# Patient Record
Sex: FEMALE | Race: WHITE | NOT HISPANIC OR LATINO | Employment: FULL TIME | URBAN - METROPOLITAN AREA
[De-identification: names, ages, dates, MRNs, and addresses within clinical notes are randomized per-mention and may not be internally consistent; named-entity substitution may affect disease eponyms.]

---

## 2021-12-14 ENCOUNTER — TELEPHONE (OUTPATIENT)
Dept: INTERNAL MEDICINE | Facility: CLINIC | Age: 61
End: 2021-12-14
Payer: COMMERCIAL

## 2021-12-17 ENCOUNTER — OFFICE VISIT (OUTPATIENT)
Dept: PRIMARY CARE CLINIC | Facility: CLINIC | Age: 61
End: 2021-12-17
Attending: INTERNAL MEDICINE
Payer: COMMERCIAL

## 2021-12-17 VITALS
HEART RATE: 90 BPM | OXYGEN SATURATION: 100 % | DIASTOLIC BLOOD PRESSURE: 56 MMHG | SYSTOLIC BLOOD PRESSURE: 116 MMHG | WEIGHT: 202.63 LBS | BODY MASS INDEX: 38.26 KG/M2 | HEIGHT: 61 IN

## 2021-12-17 DIAGNOSIS — Z87.442 HISTORY OF KIDNEY STONES: ICD-10-CM

## 2021-12-17 DIAGNOSIS — E78.2 MIXED HYPERLIPIDEMIA: ICD-10-CM

## 2021-12-17 DIAGNOSIS — E11.9 TYPE 2 DIABETES MELLITUS WITHOUT COMPLICATION, WITHOUT LONG-TERM CURRENT USE OF INSULIN: ICD-10-CM

## 2021-12-17 PROBLEM — E66.9 OBESITY: Status: ACTIVE | Noted: 2021-12-17

## 2021-12-17 PROBLEM — E66.01 CLASS 2 SEVERE OBESITY DUE TO EXCESS CALORIES WITH SERIOUS COMORBIDITY AND BODY MASS INDEX (BMI) OF 38.0 TO 38.9 IN ADULT: Status: ACTIVE | Noted: 2021-12-17

## 2021-12-17 PROBLEM — E66.9 OBESITY: Status: RESOLVED | Noted: 2021-12-17 | Resolved: 2021-12-17

## 2021-12-17 PROBLEM — E66.812 CLASS 2 SEVERE OBESITY DUE TO EXCESS CALORIES WITH SERIOUS COMORBIDITY AND BODY MASS INDEX (BMI) OF 38.0 TO 38.9 IN ADULT: Status: ACTIVE | Noted: 2021-12-17

## 2021-12-17 PROCEDURE — 99499 NO LOS: ICD-10-PCS | Mod: S$GLB,,, | Performed by: INTERNAL MEDICINE

## 2021-12-17 PROCEDURE — 99999 PR PBB SHADOW E&M-EST. PATIENT-LVL III: CPT | Mod: PBBFAC,,, | Performed by: INTERNAL MEDICINE

## 2021-12-17 PROCEDURE — 99999 PR PBB SHADOW E&M-EST. PATIENT-LVL III: ICD-10-PCS | Mod: PBBFAC,,, | Performed by: INTERNAL MEDICINE

## 2021-12-17 PROCEDURE — 99499 UNLISTED E&M SERVICE: CPT | Mod: S$GLB,,, | Performed by: INTERNAL MEDICINE

## 2021-12-17 RX ORDER — ROSUVASTATIN CALCIUM 10 MG/1
10 TABLET, COATED ORAL DAILY
Qty: 90 TABLET | Refills: 3 | Status: SHIPPED | OUTPATIENT
Start: 2021-12-17 | End: 2022-12-16 | Stop reason: SDUPTHER

## 2021-12-17 RX ORDER — METFORMIN HYDROCHLORIDE 500 MG/1
500 TABLET, EXTENDED RELEASE ORAL
Qty: 90 TABLET | Refills: 3 | Status: SHIPPED | OUTPATIENT
Start: 2021-12-17 | End: 2024-03-13 | Stop reason: SDUPTHER

## 2022-10-28 ENCOUNTER — TELEPHONE (OUTPATIENT)
Dept: PRIMARY CARE CLINIC | Facility: CLINIC | Age: 62
End: 2022-10-28

## 2022-10-28 NOTE — TELEPHONE ENCOUNTER
pt rescheduled her appt to 12/16 her mom passed recently pt stated her schedule has been very busy but she would like Dr. Marti to know that she has been experiencing leg pain

## 2022-12-13 ENCOUNTER — TELEPHONE (OUTPATIENT)
Dept: PRIMARY CARE CLINIC | Facility: CLINIC | Age: 62
End: 2022-12-13
Payer: COMMERCIAL

## 2022-12-13 DIAGNOSIS — E11.9 TYPE 2 DIABETES MELLITUS WITHOUT COMPLICATION, WITHOUT LONG-TERM CURRENT USE OF INSULIN: Primary | ICD-10-CM

## 2022-12-13 DIAGNOSIS — E78.2 MIXED HYPERLIPIDEMIA: ICD-10-CM

## 2022-12-13 NOTE — TELEPHONE ENCOUNTER
----- Message from Kavin Marti MD sent at 12/13/2022  1:34 PM CST -----  Regarding: labs  Sheyla,    She has a few labs ordered in the system already.  I wonder if they could be scheduled to be done in the [facility] before Friday of this week.    She already has an A1c, lipid panel, microalbumin to creatinine ratio ordered.  We need to add a BMP.  I will put those in the system.  Can you reach out to her and see if she can do them in [place] at the [facility] today, tomorrow, or Thursday?    CONI

## 2022-12-14 ENCOUNTER — LAB VISIT (OUTPATIENT)
Dept: LAB | Facility: HOSPITAL | Age: 62
End: 2022-12-14
Attending: INTERNAL MEDICINE
Payer: COMMERCIAL

## 2022-12-14 DIAGNOSIS — E11.9 TYPE 2 DIABETES MELLITUS WITHOUT COMPLICATION, WITHOUT LONG-TERM CURRENT USE OF INSULIN: ICD-10-CM

## 2022-12-14 LAB
ALBUMIN/CREAT UR: 9.7 UG/MG (ref 0–30)
ANION GAP SERPL CALC-SCNC: 10 MMOL/L (ref 8–16)
BUN SERPL-MCNC: 13 MG/DL (ref 8–23)
CALCIUM SERPL-MCNC: 9.6 MG/DL (ref 8.7–10.5)
CHLORIDE SERPL-SCNC: 108 MMOL/L (ref 95–110)
CHOLEST SERPL-MCNC: 213 MG/DL (ref 120–199)
CHOLEST/HDLC SERPL: 6.1 {RATIO} (ref 2–5)
CO2 SERPL-SCNC: 23 MMOL/L (ref 23–29)
CREAT SERPL-MCNC: 0.7 MG/DL (ref 0.5–1.4)
CREAT UR-MCNC: 145 MG/DL (ref 15–325)
EST. GFR  (NO RACE VARIABLE): >60 ML/MIN/1.73 M^2
ESTIMATED AVG GLUCOSE: 131 MG/DL (ref 68–131)
GLUCOSE SERPL-MCNC: 130 MG/DL (ref 70–110)
HBA1C MFR BLD: 6.2 % (ref 4–5.6)
HDLC SERPL-MCNC: 35 MG/DL (ref 40–75)
HDLC SERPL: 16.4 % (ref 20–50)
LDLC SERPL CALC-MCNC: 142.2 MG/DL (ref 63–159)
MICROALBUMIN UR DL<=1MG/L-MCNC: 14 UG/ML
NONHDLC SERPL-MCNC: 178 MG/DL
POTASSIUM SERPL-SCNC: 4.4 MMOL/L (ref 3.5–5.1)
SODIUM SERPL-SCNC: 141 MMOL/L (ref 136–145)
TRIGL SERPL-MCNC: 179 MG/DL (ref 30–150)

## 2022-12-14 PROCEDURE — 80048 BASIC METABOLIC PNL TOTAL CA: CPT | Performed by: INTERNAL MEDICINE

## 2022-12-14 PROCEDURE — 36415 COLL VENOUS BLD VENIPUNCTURE: CPT | Performed by: INTERNAL MEDICINE

## 2022-12-14 PROCEDURE — 82043 UR ALBUMIN QUANTITATIVE: CPT | Performed by: INTERNAL MEDICINE

## 2022-12-14 PROCEDURE — 80061 LIPID PANEL: CPT | Performed by: NURSE PRACTITIONER

## 2022-12-14 PROCEDURE — 83036 HEMOGLOBIN GLYCOSYLATED A1C: CPT | Performed by: NURSE PRACTITIONER

## 2022-12-14 PROCEDURE — 82570 ASSAY OF URINE CREATININE: CPT | Performed by: INTERNAL MEDICINE

## 2022-12-16 ENCOUNTER — OFFICE VISIT (OUTPATIENT)
Dept: PRIMARY CARE CLINIC | Facility: CLINIC | Age: 62
End: 2022-12-16
Attending: INTERNAL MEDICINE
Payer: COMMERCIAL

## 2022-12-16 VITALS
HEART RATE: 83 BPM | HEIGHT: 61 IN | OXYGEN SATURATION: 98 % | WEIGHT: 201.06 LBS | SYSTOLIC BLOOD PRESSURE: 127 MMHG | DIASTOLIC BLOOD PRESSURE: 78 MMHG | BODY MASS INDEX: 37.96 KG/M2

## 2022-12-16 DIAGNOSIS — Z00.00 HEALTHCARE MAINTENANCE: ICD-10-CM

## 2022-12-16 DIAGNOSIS — E78.2 MIXED HYPERLIPIDEMIA: ICD-10-CM

## 2022-12-16 DIAGNOSIS — E11.9 TYPE 2 DIABETES MELLITUS WITHOUT COMPLICATION, WITHOUT LONG-TERM CURRENT USE OF INSULIN: Primary | ICD-10-CM

## 2022-12-16 DIAGNOSIS — Z12.39 ENCOUNTER FOR SCREENING FOR MALIGNANT NEOPLASM OF BREAST, UNSPECIFIED SCREENING MODALITY: ICD-10-CM

## 2022-12-16 DIAGNOSIS — Z12.11 SCREENING FOR COLON CANCER: ICD-10-CM

## 2022-12-16 DIAGNOSIS — E66.01 CLASS 2 SEVERE OBESITY DUE TO EXCESS CALORIES WITH SERIOUS COMORBIDITY AND BODY MASS INDEX (BMI) OF 38.0 TO 38.9 IN ADULT: ICD-10-CM

## 2022-12-16 DIAGNOSIS — Z12.4 SCREENING FOR CERVICAL CANCER: ICD-10-CM

## 2022-12-16 PROCEDURE — 3008F BODY MASS INDEX DOCD: CPT | Mod: CPTII,S$GLB,, | Performed by: INTERNAL MEDICINE

## 2022-12-16 PROCEDURE — 99214 OFFICE O/P EST MOD 30 MIN: CPT | Mod: 25,S$GLB,, | Performed by: INTERNAL MEDICINE

## 2022-12-16 PROCEDURE — 3061F PR NEG MICROALBUMINURIA RESULT DOCUMENTED/REVIEW: ICD-10-PCS | Mod: CPTII,S$GLB,, | Performed by: INTERNAL MEDICINE

## 2022-12-16 PROCEDURE — 99999 PR PBB SHADOW E&M-EST. PATIENT-LVL V: ICD-10-PCS | Mod: PBBFAC,,, | Performed by: INTERNAL MEDICINE

## 2022-12-16 PROCEDURE — 3078F DIAST BP <80 MM HG: CPT | Mod: CPTII,S$GLB,, | Performed by: INTERNAL MEDICINE

## 2022-12-16 PROCEDURE — 90471 IMMUNIZATION ADMIN: CPT | Mod: S$GLB,,, | Performed by: INTERNAL MEDICINE

## 2022-12-16 PROCEDURE — 90715 TDAP VACCINE 7 YRS/> IM: CPT | Mod: S$GLB,,, | Performed by: INTERNAL MEDICINE

## 2022-12-16 PROCEDURE — 99214 PR OFFICE/OUTPT VISIT, EST, LEVL IV, 30-39 MIN: ICD-10-PCS | Mod: 25,S$GLB,, | Performed by: INTERNAL MEDICINE

## 2022-12-16 PROCEDURE — 3074F PR MOST RECENT SYSTOLIC BLOOD PRESSURE < 130 MM HG: ICD-10-PCS | Mod: CPTII,S$GLB,, | Performed by: INTERNAL MEDICINE

## 2022-12-16 PROCEDURE — 1159F MED LIST DOCD IN RCRD: CPT | Mod: CPTII,S$GLB,, | Performed by: INTERNAL MEDICINE

## 2022-12-16 PROCEDURE — 3008F PR BODY MASS INDEX (BMI) DOCUMENTED: ICD-10-PCS | Mod: CPTII,S$GLB,, | Performed by: INTERNAL MEDICINE

## 2022-12-16 PROCEDURE — 3044F PR MOST RECENT HEMOGLOBIN A1C LEVEL <7.0%: ICD-10-PCS | Mod: CPTII,S$GLB,, | Performed by: INTERNAL MEDICINE

## 2022-12-16 PROCEDURE — 3061F NEG MICROALBUMINURIA REV: CPT | Mod: CPTII,S$GLB,, | Performed by: INTERNAL MEDICINE

## 2022-12-16 PROCEDURE — 3066F NEPHROPATHY DOC TX: CPT | Mod: CPTII,S$GLB,, | Performed by: INTERNAL MEDICINE

## 2022-12-16 PROCEDURE — 1159F PR MEDICATION LIST DOCUMENTED IN MEDICAL RECORD: ICD-10-PCS | Mod: CPTII,S$GLB,, | Performed by: INTERNAL MEDICINE

## 2022-12-16 PROCEDURE — 1160F PR REVIEW ALL MEDS BY PRESCRIBER/CLIN PHARMACIST DOCUMENTED: ICD-10-PCS | Mod: CPTII,S$GLB,, | Performed by: INTERNAL MEDICINE

## 2022-12-16 PROCEDURE — 1160F RVW MEDS BY RX/DR IN RCRD: CPT | Mod: CPTII,S$GLB,, | Performed by: INTERNAL MEDICINE

## 2022-12-16 PROCEDURE — 99999 PR PBB SHADOW E&M-EST. PATIENT-LVL V: CPT | Mod: PBBFAC,,, | Performed by: INTERNAL MEDICINE

## 2022-12-16 PROCEDURE — 90715 TDAP VACCINE GREATER THAN OR EQUAL TO 7YO IM: ICD-10-PCS | Mod: S$GLB,,, | Performed by: INTERNAL MEDICINE

## 2022-12-16 PROCEDURE — 3066F PR DOCUMENTATION OF TREATMENT FOR NEPHROPATHY: ICD-10-PCS | Mod: CPTII,S$GLB,, | Performed by: INTERNAL MEDICINE

## 2022-12-16 PROCEDURE — 90471 TDAP VACCINE GREATER THAN OR EQUAL TO 7YO IM: ICD-10-PCS | Mod: S$GLB,,, | Performed by: INTERNAL MEDICINE

## 2022-12-16 PROCEDURE — 3044F HG A1C LEVEL LT 7.0%: CPT | Mod: CPTII,S$GLB,, | Performed by: INTERNAL MEDICINE

## 2022-12-16 PROCEDURE — 3078F PR MOST RECENT DIASTOLIC BLOOD PRESSURE < 80 MM HG: ICD-10-PCS | Mod: CPTII,S$GLB,, | Performed by: INTERNAL MEDICINE

## 2022-12-16 PROCEDURE — 3074F SYST BP LT 130 MM HG: CPT | Mod: CPTII,S$GLB,, | Performed by: INTERNAL MEDICINE

## 2022-12-16 RX ORDER — ROSUVASTATIN CALCIUM 10 MG/1
10 TABLET, COATED ORAL DAILY
Qty: 90 TABLET | Refills: 3 | Status: SHIPPED | OUTPATIENT
Start: 2022-12-16 | End: 2024-03-13 | Stop reason: SDUPTHER

## 2022-12-16 NOTE — ASSESSMENT & PLAN NOTE
Due for cervical CA screening  Due for Mammogram  Due for Colorectal CA screening    Due for Tdap vaccine      Health Related Goals:  1)  Wants to lose weight and get down to 150 lbs (has lost 42 lbs over the past few years)  2)  Wants to exercise regularly  3)  Be more attentive to DM

## 2022-12-16 NOTE — ASSESSMENT & PLAN NOTE
Not seen since one year ago (12/2021)  Recent A1c 12/2022 of 6.2 -- at goal (goal < 7.0) -- recheck 6/2023  No albuminuria as of 12/2022  -- recheck 12/2023  Needs diabetic eye exam -- ordered optometry eval  Foot exam done today -- no neuropathy as of 12/2023    Resume metformin  mg -- educated about diet and tolerability  Needs to be on at least a moderate intensity statin and may attempt to achieve LDL goal < 70 (based on new ADA guidelines) -- will start with crestor 10 mg daily, but plan to increase progressively as tolerated  Discussed importance of exercise  Recheck next A1c in 6 months (6/2023)

## 2022-12-16 NOTE — PATIENT INSTRUCTIONS
PLEASE REMEMBER TO CALLL US FIRST with any medical questions or concerns. We try very hard to keep you out of the emergency room if we are able to see you and help with your concern. It is one of our many ways to keep our patients healthy.   For now the best way to reach us is by calling [facility]'s direct number:  CALL OUR OFFICE AT: [phone]    In the months to come we are also hoping to be available for scheduling via the portal.  Dr. Jermaine Dhillon and RAMIN Platt are both here and available most days of the week if you need to be seen in person.  I am here every Friday but can also make myself available on other days if necessary as long as I have enough notice.  Please do not ever hesitate to reach to me via the patient portal.      Please let us know if you have any challenges getting scheduled with our behavioral therapist/ or dietitian or if you have any difficulty with any new medication(s) that were prescribed.     Dr. HAMILTON     Tolerating Metformin    Common side effects of metformin intolerance include nausea, diarrhea, vomiting and abdominal pain. A properly balanced meal/snack containing protein and carbohydrates can help improve tolerance of metformin and reduce some of the above symptoms.     Pairing protein and carbohydrate foods together at the time you take your medication is key. Protein foods break down slowly and slow down the digestion of carbohydrates. Therefore, keeping your stomach full for longer periods of time. Protein food sources include eggs, cheese, turkey, chicken, beef, pork, seafood, greek yogurt and nuts. The most nutrient-dense carbohydrates include fruits, vegetables and beans.     As Metformin is often taken in the morning, a balanced breakfast of protein and carbohydrates will aid in tolerance. Below are some breakfast and snack options:    -2 eggs with a serving of fruit   -Banana or apple with peanut butter/almond butter  -Greek yogurt topped with fruit  or nuts of choice  -String cheese and grapes  -Handful nuts (any kind) with fruit  -Toast with 2 Tbsp peanut butter/almond butter  -Cottage cheese with peaches  -Chicken salad with veggie dippers  -Protein supplements (bars/shakes/smoothies)     *It is best to avoid high carb and high fat (greasy) meals when taking metformin...and NEVER take on an empty stomach.

## 2022-12-16 NOTE — PROGRESS NOTES
"Patient ID: Lexis Allen is a 62 y.o. female.    Chief Complaint: Follow-up      HPI:    HPI   Here for follow-up.  Not seen since 2021.  A1c still at goal at 6.2.  BMP normal.  No albuminuria.  Lipid panel -- TG/HDL ratio 5.1, , non-HDL cholesterol 178,   Her mom passed away in .  She missed her appointment in March because she started making recurrent trips from  to [place] and was spending all of her time with her mother.    She only took the metformin for a few days and then stopped because of diarrhea.  She also stopped the crestor because she wasn't sure which medicine was causing the diarrhea.      Her mother was 85 at the age of her death and did suffer from DM.  Her mother's death has prompted her to want to try and take better care of herself and her own DM.  Her father also had DM and  at the age of 90.      Still with the same discomfort in her left foot, feeling like a "lump" on volar aspect when she is barefoot.  Stable and chronic for past year.    Last PAP smear > 3 years ago  Last mammogram about 3 years ago  Last colonoscopy ~8 years ago per her recollection    Exercising 2 days per week -- 30 minutes each time -- walking  Her children bought her a treadmill    She will check her sugars often and worries excessively when they fluctuate from 110s to 130s.  Advised her that as a type 2 diabetic who is not on insulin, no need to check sugars.  We will follow A1c instead.   She has been following a carb restrictive diet.  Discussed that she could start including fruits as part of her diet.    Discussed importance of regular exercise.     The 10-year ASCVD risk score (Lory SANTA, et al., 2019) is: 10.2%    Values used to calculate the score:      Age: 62 years      Sex: Female      Is Non- : No      Diabetic: Yes      Tobacco smoker: No      Systolic Blood Pressure: 127 mmHg      Is BP treated: No      HDL Cholesterol: 35 mg/dL      Total Cholesterol: " "213 mg/dL       Review of Systems   Constitutional:  Negative for malaise/fatigue.   Eyes:  Negative for blurred vision.   Respiratory:  Negative for shortness of breath.    Cardiovascular:  Negative for chest pain.   Gastrointestinal:  Negative for blood in stool and heartburn.   Genitourinary:  Positive for hematuria (one episode from kidney stone in November that passed naturally).   Musculoskeletal:  Negative for myalgias.   Neurological:  Positive for tingling (left hand, feels like most of her fingers).   Endo/Heme/Allergies:  Negative for polydipsia.      Vital Signs  /78 (BP Location: Right arm, Patient Position: Sitting, BP Method: Large (Automatic))   Pulse 83   Ht 5' 1" (1.549 m)   Wt 91.2 kg (201 lb 1 oz)   SpO2 98%   BMI 37.99 kg/m²   Physical Exam  General:  Well-developed, well-nourished, no acute distress  Head:  Normocephalic, atraumatic  Eyes:  PERRL, conjunctiva pink without hemorrhages or petechiae, anicteric sclera, no episcleritis  Nose:  Normal turbinates, pink mucosa without purulent nasal discharge  Mouth:  Moist mucous membranes, no pharyngeal exudates or erythema  Neck:: Supple, normal ROM; No carotid bruit with normal carotid upstroke; No JVD  Chest:  clear to auscultation bilaterally, no respiratory distress  Heart:  Regular rate and rhythm without murmur, rub, or gallop  Abdomen:  Soft, nontender, no distension, normoactive bowel sounds, no abdominal bruit  Extremities:  No clubbing, cyanosis, or edema  Neuro:  Alert and oriented, no focal deficits    Protective Sensation (w/ 10 gram monofilament):  Right: Intact  Left: Intact    Visual Inspection:  Callus -  Bilateral    Pedal Pulses:   Right: Present  Left: Present    Posterior tibialis:   Right:Present  Left: Present     Physical Exam      Screening or Prevention Patient's value Goal Complete/Controlled?   HgA1C Testing and Control   Lab Results   Component Value Date    HGBA1C 6.2 (H) 12/14/2022      Annually/Less than 8% " Yes     Lipid profile : 12/14/2022 Annually Yes     LDL control Lab Results   Component Value Date    LDLCALC 142.2 12/14/2022    Annually/Less than 100 mg/dl  No     Nephropathy screening Lab Results   Component Value Date    LABMICR 14.0 12/14/2022     Lab Results   Component Value Date    PROTEINUA Negative 06/23/2009    Annually Yes     Blood pressure BP Readings from Last 1 Encounters:   12/16/22 127/78    Less than 140/90 Yes     Dilated retinal exam Most Recent Eye Exam Date: Not Found Annually Yes     Foot exam   : 12/16/2022 Annually Yes       Hemoglobin A1C   Date Value Ref Range Status   12/14/2022 6.2 (H) 4.0 - 5.6 % Final     Comment:     ADA Screening Guidelines:  5.7-6.4%  Consistent with prediabetes  >or=6.5%  Consistent with diabetes    High levels of fetal hemoglobin interfere with the HbA1C  assay. Heterozygous hemoglobin variants (HbS, HgC, etc)do  not significantly interfere with this assay.   However, presence of multiple variants may affect accuracy.     08/26/2011 6.2 4.0 - 6.2 % Final   08/18/2010 5.9 4.0 - 6.2 % Final       Chemistry        Component Value Date/Time     12/14/2022 0731    K 4.4 12/14/2022 0731     12/14/2022 0731    CO2 23 12/14/2022 0731    BUN 13 12/14/2022 0731    CREATININE 0.7 12/14/2022 0731     (H) 12/14/2022 0731        Component Value Date/Time    CALCIUM 9.6 12/14/2022 0731    AST 29 08/26/2011 0953    ALT 35 08/26/2011 0953            Assessment/Plan  Type 2 diabetes mellitus without complication, without long-term current use of insulin  Not seen since one year ago (12/2021)  Recent A1c 12/2022 of 6.2 -- at goal (goal < 7.0) -- recheck 6/2023  No albuminuria as of 12/2022  -- recheck 12/2023  Needs diabetic eye exam -- ordered optometry eval  Foot exam done today -- no neuropathy as of 12/2023    Resume metformin  mg -- educated about diet and tolerability  Needs to be on at least a moderate intensity statin and may attempt to achieve  LDL goal < 70 (based on new ADA guidelines) -- will start with crestor 10 mg daily, but plan to increase progressively as tolerated  Discussed importance of exercise  Recheck next A1c in 6 months (6/2023)      Healthcare maintenance  Due for cervical CA screening  Due for Mammogram  Due for Colorectal CA screening    Due for Tdap vaccine      Health Related Goals:  1)  Wants to lose weight and get down to 150 lbs (has lost 42 lbs over the past few years)  2)  Wants to exercise regularly  3)  Be more attentive to DM    Mixed hyperlipidemia  Goal LDL < 100 or maybe even < 70  Re-start Crestor 10 mg daily  Recheck in 1 to 3 months    Class 2 severe obesity due to excess calories with serious comorbidity and body mass index (BMI) of 38.0 to 38.9 in adult  Discussed increased efforts at diet, exercise, and weight loss     Follow up in about 3 months (around 3/16/2023).    Orders Placed This Encounter   Procedures    Mammo Digital Screening Bilat     Standing Status:   Future     Standing Expiration Date:   12/16/2024     Order Specific Question:   May the Radiologist modify the order per protocol to meet the clinical needs of the patient?     Answer:   Yes     Order Specific Question:   Release to patient     Answer:   Immediate    (In Office Administered) Tdap Vaccine    Ambulatory referral/consult to Endo Procedure      Standing Status:   Future     Standing Expiration Date:   6/16/2023     Referral Priority:   Routine     Referral Type:   Consultation     Number of Visits Requested:   1    Ambulatory referral/consult to Gynecology     Standing Status:   Future     Standing Expiration Date:   1/16/2024     Referral Priority:   Routine     Referral Type:   Consultation     Referral Reason:   Specialty Services Required     Requested Specialty:   Gynecology     Number of Visits Requested:   1       Health Maintenance  Health Maintenance         Date Due Completion Date    Hepatitis C Screening Never done ---     Cervical Cancer Screening Never done ---    HIV Screening Never done ---    TETANUS VACCINE Never done ---    Mammogram Never done ---    Shingles Vaccine (1 of 2) Never done ---    Colorectal Cancer Screening 01/01/2015 1/1/2012    COVID-19 Vaccine (4 - Booster for Pfizer series) 01/17/2022 11/22/2021    Influenza Vaccine (1) Never done ---    Lipid Panel 12/14/2027 12/14/2022              Time: Spent 30 minutes with patient, with > 50% of time spent counseling

## 2022-12-16 NOTE — PROGRESS NOTES
After obtaining consent, and per orders of Dr. Marti, injection of Tdap given by SISSY ARIAS. Patient instructed to remain in clinic for 15 minutes afterwards, and to report any adverse reaction to staff immediately.

## 2022-12-16 NOTE — ASSESSMENT & PLAN NOTE
Goal LDL < 100 or maybe even < 70  Re-start Crestor 10 mg daily  Recheck in 1 to 3 months (1/2023 to 3/2023)

## 2022-12-17 ENCOUNTER — OFFICE VISIT (OUTPATIENT)
Dept: URGENT CARE | Facility: CLINIC | Age: 62
End: 2022-12-17
Payer: COMMERCIAL

## 2022-12-17 VITALS
OXYGEN SATURATION: 97 % | RESPIRATION RATE: 18 BRPM | DIASTOLIC BLOOD PRESSURE: 80 MMHG | SYSTOLIC BLOOD PRESSURE: 130 MMHG | BODY MASS INDEX: 37.57 KG/M2 | TEMPERATURE: 101 F | WEIGHT: 199 LBS | HEART RATE: 111 BPM | HEIGHT: 61 IN

## 2022-12-17 DIAGNOSIS — R50.9 FEVER, UNSPECIFIED FEVER CAUSE: ICD-10-CM

## 2022-12-17 DIAGNOSIS — J06.9 UPPER RESPIRATORY TRACT INFECTION, UNSPECIFIED TYPE: Primary | ICD-10-CM

## 2022-12-17 LAB
CTP QC/QA: YES
CTP QC/QA: YES
POC MOLECULAR INFLUENZA A AGN: NEGATIVE
POC MOLECULAR INFLUENZA B AGN: NEGATIVE
SARS-COV-2 AG RESP QL IA.RAPID: NEGATIVE

## 2022-12-17 PROCEDURE — 3075F PR MOST RECENT SYSTOLIC BLOOD PRESS GE 130-139MM HG: ICD-10-PCS | Mod: CPTII,S$GLB,, | Performed by: NURSE PRACTITIONER

## 2022-12-17 PROCEDURE — 3066F PR DOCUMENTATION OF TREATMENT FOR NEPHROPATHY: ICD-10-PCS | Mod: CPTII,S$GLB,, | Performed by: NURSE PRACTITIONER

## 2022-12-17 PROCEDURE — 87811 SARS-COV-2 COVID19 W/OPTIC: CPT | Mod: QW,S$GLB,, | Performed by: NURSE PRACTITIONER

## 2022-12-17 PROCEDURE — 87502 INFLUENZA DNA AMP PROBE: CPT | Mod: QW,S$GLB,, | Performed by: NURSE PRACTITIONER

## 2022-12-17 PROCEDURE — 3066F NEPHROPATHY DOC TX: CPT | Mod: CPTII,S$GLB,, | Performed by: NURSE PRACTITIONER

## 2022-12-17 PROCEDURE — 3075F SYST BP GE 130 - 139MM HG: CPT | Mod: CPTII,S$GLB,, | Performed by: NURSE PRACTITIONER

## 2022-12-17 PROCEDURE — 1160F PR REVIEW ALL MEDS BY PRESCRIBER/CLIN PHARMACIST DOCUMENTED: ICD-10-PCS | Mod: CPTII,S$GLB,, | Performed by: NURSE PRACTITIONER

## 2022-12-17 PROCEDURE — 1160F RVW MEDS BY RX/DR IN RCRD: CPT | Mod: CPTII,S$GLB,, | Performed by: NURSE PRACTITIONER

## 2022-12-17 PROCEDURE — 3044F PR MOST RECENT HEMOGLOBIN A1C LEVEL <7.0%: ICD-10-PCS | Mod: CPTII,S$GLB,, | Performed by: NURSE PRACTITIONER

## 2022-12-17 PROCEDURE — 3044F HG A1C LEVEL LT 7.0%: CPT | Mod: CPTII,S$GLB,, | Performed by: NURSE PRACTITIONER

## 2022-12-17 PROCEDURE — 3079F DIAST BP 80-89 MM HG: CPT | Mod: CPTII,S$GLB,, | Performed by: NURSE PRACTITIONER

## 2022-12-17 PROCEDURE — 99213 PR OFFICE/OUTPT VISIT, EST, LEVL III, 20-29 MIN: ICD-10-PCS | Mod: S$GLB,,, | Performed by: NURSE PRACTITIONER

## 2022-12-17 PROCEDURE — 3079F PR MOST RECENT DIASTOLIC BLOOD PRESSURE 80-89 MM HG: ICD-10-PCS | Mod: CPTII,S$GLB,, | Performed by: NURSE PRACTITIONER

## 2022-12-17 PROCEDURE — 1159F MED LIST DOCD IN RCRD: CPT | Mod: CPTII,S$GLB,, | Performed by: NURSE PRACTITIONER

## 2022-12-17 PROCEDURE — 99213 OFFICE O/P EST LOW 20 MIN: CPT | Mod: S$GLB,,, | Performed by: NURSE PRACTITIONER

## 2022-12-17 PROCEDURE — 3008F BODY MASS INDEX DOCD: CPT | Mod: CPTII,S$GLB,, | Performed by: NURSE PRACTITIONER

## 2022-12-17 PROCEDURE — 1159F PR MEDICATION LIST DOCUMENTED IN MEDICAL RECORD: ICD-10-PCS | Mod: CPTII,S$GLB,, | Performed by: NURSE PRACTITIONER

## 2022-12-17 PROCEDURE — 3061F PR NEG MICROALBUMINURIA RESULT DOCUMENTED/REVIEW: ICD-10-PCS | Mod: CPTII,S$GLB,, | Performed by: NURSE PRACTITIONER

## 2022-12-17 PROCEDURE — 87502 POCT INFLUENZA A/B MOLECULAR: ICD-10-PCS | Mod: QW,S$GLB,, | Performed by: NURSE PRACTITIONER

## 2022-12-17 PROCEDURE — 3008F PR BODY MASS INDEX (BMI) DOCUMENTED: ICD-10-PCS | Mod: CPTII,S$GLB,, | Performed by: NURSE PRACTITIONER

## 2022-12-17 PROCEDURE — 87811 SARS CORONAVIRUS 2 ANTIGEN POCT, MANUAL READ: ICD-10-PCS | Mod: QW,S$GLB,, | Performed by: NURSE PRACTITIONER

## 2022-12-17 PROCEDURE — 3061F NEG MICROALBUMINURIA REV: CPT | Mod: CPTII,S$GLB,, | Performed by: NURSE PRACTITIONER

## 2022-12-17 RX ORDER — ACETAMINOPHEN 500 MG
1000 TABLET ORAL
Status: COMPLETED | OUTPATIENT
Start: 2022-12-17 | End: 2022-12-17

## 2022-12-17 RX ADMIN — Medication 1000 MG: at 05:12

## 2022-12-17 NOTE — PROGRESS NOTES
"Subjective:       Patient ID: Lexis Allen is a 62 y.o. female.    Vitals:  height is 5' 1" (1.549 m) and weight is 90.3 kg (199 lb). Her oral temperature is 101.2 °F (38.4 °C) (abnormal). Her blood pressure is 130/80 and her pulse is 111 (abnormal). Her respiration is 18 and oxygen saturation is 97%.     Chief Complaint: Fever (Started yesterday)    Patient presents for evaluation  body aches that started last night. Pt reports chills, body aches, headache, and diarrhea. Pt is running a 101.3 fever in clinic. She hasn't taken any fever reducer today. Patient reports receiving Tdap injection yesterday. She also reports right side pleuritic pain with cough and deep breathing.      Fever   This is a new problem. The current episode started yesterday. The problem occurs intermittently. The problem has been gradually worsening. Her temperature was unmeasured prior to arrival. Associated symptoms include congestion, coughing, diarrhea, headaches and muscle aches.     Constitution: Positive for fever.   HENT:  Positive for congestion.    Respiratory:  Positive for cough.         Right side pleuritic pain with cough and deep breathing    Gastrointestinal:  Positive for diarrhea.   Neurological:  Positive for headaches.     Objective:      Physical Exam   Constitutional: She is oriented to person, place, and time. She appears well-developed. She is cooperative.  Non-toxic appearance. She does not appear ill. No distress.   HENT:   Head: Normocephalic and atraumatic.   Ears:   Right Ear: Hearing, tympanic membrane, external ear and ear canal normal.   Left Ear: Hearing, tympanic membrane, external ear and ear canal normal.   Nose: Nose normal. No mucosal edema, rhinorrhea or nasal deformity. No epistaxis. Right sinus exhibits no maxillary sinus tenderness and no frontal sinus tenderness. Left sinus exhibits no maxillary sinus tenderness and no frontal sinus tenderness.   Mouth/Throat: Uvula is midline and mucous " membranes are normal. No trismus in the jaw. Normal dentition. No uvula swelling. Posterior oropharyngeal edema and posterior oropharyngeal erythema (mild) present. No oropharyngeal exudate, tonsillar abscesses or cobblestoning. Tonsils are 1+ on the right. Tonsils are 1+ on the left.   Eyes: Conjunctivae and lids are normal. No scleral icterus.   Neck: Trachea normal and phonation normal. Neck supple. No edema present. No erythema present. No neck rigidity present.   Cardiovascular: Normal rate, regular rhythm, normal heart sounds and normal pulses.   Pulmonary/Chest: Effort normal and breath sounds normal. No respiratory distress. She has no decreased breath sounds. She has no rhonchi.   Abdominal: Normal appearance.   Musculoskeletal: Normal range of motion.         General: No deformity. Normal range of motion.   Neurological: She is alert and oriented to person, place, and time. She exhibits normal muscle tone. Coordination normal.   Skin: Skin is warm, dry, intact, not diaphoretic and not pale.   Psychiatric: Her speech is normal and behavior is normal. Judgment and thought content normal.   Nursing note and vitals reviewed.      Assessment:       1. Upper respiratory tract infection, unspecified type    2. Fever, unspecified fever cause          Plan:         Upper respiratory tract infection, unspecified type    Fever, unspecified fever cause  -     POCT Influenza A/B MOLECULAR  -     SARS Coronavirus 2 Antigen, POCT Manual Read    Other orders  -     acetaminophen tablet 1,000 mg         Results for orders placed or performed in visit on 12/17/22   POCT Influenza A/B MOLECULAR   Result Value Ref Range    POC Molecular Influenza A Ag Negative Negative, Not Reported    POC Molecular Influenza B Ag Negative Negative, Not Reported     Acceptable Yes    SARS Coronavirus 2 Antigen, POCT Manual Read   Result Value Ref Range    SARS Coronavirus 2 Antigen Negative Negative      Acceptable Yes             PLEASE READ YOUR DISCHARGE INSTRUCTIONS ENTIRELY AS IT CONTAINS IMPORTANT INFORMATION.      Please drink plenty of fluids.    Please get plenty of rest.    Please return here or go to the Emergency Department for any concerns or worsening of condition.    Please take an over the counter antihistamine medication (allegra/Claritin/Zyrtec) of your choice as directed.    Try an over the counter decongestant like Mucinex D or Sudafed. You buy this behind the pharmacy counter    If you do have Hypertension or palpitations, it is safe to take Coricidin HBP for relief of sinus symptoms.    If not allergic, please take over the counter Tylenol (Acetaminophen) and/or Motrin (Ibuprofen) as directed for control of pain and/or fever.  Please follow up with your primary care doctor or specialist as needed.    Sore throat recommendations: Warm fluids, warm salt water gargles, throat lozenges, tea, honey, soup, rest, hydration.    Use over the counter flonase: one spray each nostril twice daily OR two sprays each nostril once daily.     Sinus rinses DO NOT USE TAP WATER, if you must, water must be a rolling boil for 1 minute, let it cool, then use.  May use distilled water, or over the counter nasal saline rinses.  Vics vapor rub in shower to help open nasal passages.  May use nasal gel to keep passages moisturized.  May use Nasal saline sprays during the day for added relief of congestion.   For those who go to the gym, please do not use the sauna or steam room now to clear sinuses.    If you  smoke, please stop smoking.      Please return or see your primary care doctor if you develop new or worsening symptoms.     Please arrange follow up with your primary medical clinic as soon as possible. You must understand that you've received an Urgent Care treatment only and that you may be released before all of your medical problems are known or treated. You, the patient, will arrange for follow up as  instructed. If your symptoms worsen or fail to improve you should go to the Emergency Room.

## 2022-12-19 ENCOUNTER — HOSPITAL ENCOUNTER (OUTPATIENT)
Dept: RADIOLOGY | Facility: CLINIC | Age: 62
Discharge: HOME | End: 2022-12-19
Attending: PHYSICIAN ASSISTANT
Payer: COMMERCIAL

## 2022-12-19 ENCOUNTER — OFFICE VISIT (OUTPATIENT)
Dept: URGENT CARE | Facility: CLINIC | Age: 62
End: 2022-12-19
Payer: COMMERCIAL

## 2022-12-19 VITALS
RESPIRATION RATE: 18 BRPM | BODY MASS INDEX: 37.57 KG/M2 | OXYGEN SATURATION: 96 % | TEMPERATURE: 103 F | WEIGHT: 199 LBS | SYSTOLIC BLOOD PRESSURE: 130 MMHG | HEART RATE: 117 BPM | DIASTOLIC BLOOD PRESSURE: 60 MMHG | HEIGHT: 61 IN

## 2022-12-19 DIAGNOSIS — J18.9 PNEUMONIA OF RIGHT MIDDLE LOBE DUE TO INFECTIOUS ORGANISM: ICD-10-CM

## 2022-12-19 DIAGNOSIS — R31.9 HEMATURIA, UNSPECIFIED TYPE: ICD-10-CM

## 2022-12-19 DIAGNOSIS — R50.9 FEVER, UNSPECIFIED FEVER CAUSE: ICD-10-CM

## 2022-12-19 DIAGNOSIS — R50.9 FEVER, UNSPECIFIED FEVER CAUSE: Primary | ICD-10-CM

## 2022-12-19 LAB
BILIRUB UR QL STRIP: NEGATIVE
CTP QC/QA: YES
CTP QC/QA: YES
GLUCOSE UR QL STRIP: NEGATIVE
KETONES UR QL STRIP: NEGATIVE
LEUKOCYTE ESTERASE UR QL STRIP: NEGATIVE
PH, POC UA: 9
POC BLOOD, URINE: POSITIVE
POC MOLECULAR INFLUENZA A AGN: NEGATIVE
POC MOLECULAR INFLUENZA B AGN: NEGATIVE
POC NITRATES, URINE: NEGATIVE
PROT UR QL STRIP: POSITIVE
SARS-COV-2 AG RESP QL IA.RAPID: NEGATIVE
SP GR UR STRIP: 1 (ref 1–1.03)
UROBILINOGEN UR STRIP-ACNC: POSITIVE (ref 0.1–1.1)

## 2022-12-19 PROCEDURE — 3075F SYST BP GE 130 - 139MM HG: CPT | Mod: CPTII,S$GLB,, | Performed by: PHYSICIAN ASSISTANT

## 2022-12-19 PROCEDURE — 87086 URINE CULTURE/COLONY COUNT: CPT | Performed by: PHYSICIAN ASSISTANT

## 2022-12-19 PROCEDURE — 1159F PR MEDICATION LIST DOCUMENTED IN MEDICAL RECORD: ICD-10-PCS | Mod: CPTII,S$GLB,, | Performed by: PHYSICIAN ASSISTANT

## 2022-12-19 PROCEDURE — 96372 THER/PROPH/DIAG INJ SC/IM: CPT | Mod: S$GLB,,, | Performed by: EMERGENCY MEDICINE

## 2022-12-19 PROCEDURE — 71046 X-RAY EXAM CHEST 2 VIEWS: CPT | Mod: S$GLB,,, | Performed by: RADIOLOGY

## 2022-12-19 PROCEDURE — 96372 PR INJECTION,THERAP/PROPH/DIAG2ST, IM OR SUBCUT: ICD-10-PCS | Mod: S$GLB,,, | Performed by: EMERGENCY MEDICINE

## 2022-12-19 PROCEDURE — 87077 CULTURE AEROBIC IDENTIFY: CPT | Performed by: PHYSICIAN ASSISTANT

## 2022-12-19 PROCEDURE — 3078F DIAST BP <80 MM HG: CPT | Mod: CPTII,S$GLB,, | Performed by: PHYSICIAN ASSISTANT

## 2022-12-19 PROCEDURE — 3075F PR MOST RECENT SYSTOLIC BLOOD PRESS GE 130-139MM HG: ICD-10-PCS | Mod: CPTII,S$GLB,, | Performed by: PHYSICIAN ASSISTANT

## 2022-12-19 PROCEDURE — 3061F NEG MICROALBUMINURIA REV: CPT | Mod: CPTII,S$GLB,, | Performed by: PHYSICIAN ASSISTANT

## 2022-12-19 PROCEDURE — U0002 COVID-19 LAB TEST NON-CDC: HCPCS | Mod: QW,S$GLB,, | Performed by: PHYSICIAN ASSISTANT

## 2022-12-19 PROCEDURE — U0002 SARS CORONAVIRUS 2 ANTIGEN POCT, MANUAL READ: ICD-10-PCS | Mod: QW,S$GLB,, | Performed by: PHYSICIAN ASSISTANT

## 2022-12-19 PROCEDURE — 87502 INFLUENZA DNA AMP PROBE: CPT | Mod: QW,S$GLB,, | Performed by: PHYSICIAN ASSISTANT

## 2022-12-19 PROCEDURE — 3044F PR MOST RECENT HEMOGLOBIN A1C LEVEL <7.0%: ICD-10-PCS | Mod: CPTII,S$GLB,, | Performed by: PHYSICIAN ASSISTANT

## 2022-12-19 PROCEDURE — 3008F PR BODY MASS INDEX (BMI) DOCUMENTED: ICD-10-PCS | Mod: CPTII,S$GLB,, | Performed by: PHYSICIAN ASSISTANT

## 2022-12-19 PROCEDURE — 3066F PR DOCUMENTATION OF TREATMENT FOR NEPHROPATHY: ICD-10-PCS | Mod: CPTII,S$GLB,, | Performed by: PHYSICIAN ASSISTANT

## 2022-12-19 PROCEDURE — 99214 OFFICE O/P EST MOD 30 MIN: CPT | Mod: 25,S$GLB,, | Performed by: PHYSICIAN ASSISTANT

## 2022-12-19 PROCEDURE — 1160F PR REVIEW ALL MEDS BY PRESCRIBER/CLIN PHARMACIST DOCUMENTED: ICD-10-PCS | Mod: CPTII,S$GLB,, | Performed by: PHYSICIAN ASSISTANT

## 2022-12-19 PROCEDURE — 87186 SC STD MICRODIL/AGAR DIL: CPT | Performed by: PHYSICIAN ASSISTANT

## 2022-12-19 PROCEDURE — 81003 POCT URINALYSIS, DIPSTICK, AUTOMATED, W/O SCOPE: ICD-10-PCS | Mod: QW,S$GLB,, | Performed by: PHYSICIAN ASSISTANT

## 2022-12-19 PROCEDURE — 1160F RVW MEDS BY RX/DR IN RCRD: CPT | Mod: CPTII,S$GLB,, | Performed by: PHYSICIAN ASSISTANT

## 2022-12-19 PROCEDURE — 3066F NEPHROPATHY DOC TX: CPT | Mod: CPTII,S$GLB,, | Performed by: PHYSICIAN ASSISTANT

## 2022-12-19 PROCEDURE — 81003 URINALYSIS AUTO W/O SCOPE: CPT | Mod: QW,S$GLB,, | Performed by: PHYSICIAN ASSISTANT

## 2022-12-19 PROCEDURE — 71046 XR CHEST PA AND LATERAL: ICD-10-PCS | Mod: S$GLB,,, | Performed by: RADIOLOGY

## 2022-12-19 PROCEDURE — 3008F BODY MASS INDEX DOCD: CPT | Mod: CPTII,S$GLB,, | Performed by: PHYSICIAN ASSISTANT

## 2022-12-19 PROCEDURE — 87502 POCT INFLUENZA A/B MOLECULAR: ICD-10-PCS | Mod: QW,S$GLB,, | Performed by: PHYSICIAN ASSISTANT

## 2022-12-19 PROCEDURE — 87088 URINE BACTERIA CULTURE: CPT | Performed by: PHYSICIAN ASSISTANT

## 2022-12-19 PROCEDURE — 3044F HG A1C LEVEL LT 7.0%: CPT | Mod: CPTII,S$GLB,, | Performed by: PHYSICIAN ASSISTANT

## 2022-12-19 PROCEDURE — 3061F PR NEG MICROALBUMINURIA RESULT DOCUMENTED/REVIEW: ICD-10-PCS | Mod: CPTII,S$GLB,, | Performed by: PHYSICIAN ASSISTANT

## 2022-12-19 PROCEDURE — 3078F PR MOST RECENT DIASTOLIC BLOOD PRESSURE < 80 MM HG: ICD-10-PCS | Mod: CPTII,S$GLB,, | Performed by: PHYSICIAN ASSISTANT

## 2022-12-19 PROCEDURE — 99214 PR OFFICE/OUTPT VISIT, EST, LEVL IV, 30-39 MIN: ICD-10-PCS | Mod: 25,S$GLB,, | Performed by: PHYSICIAN ASSISTANT

## 2022-12-19 PROCEDURE — 1159F MED LIST DOCD IN RCRD: CPT | Mod: CPTII,S$GLB,, | Performed by: PHYSICIAN ASSISTANT

## 2022-12-19 RX ORDER — LIDOCAINE HYDROCHLORIDE 10 MG/ML
1 INJECTION INFILTRATION; PERINEURAL
Status: COMPLETED | OUTPATIENT
Start: 2022-12-19 | End: 2022-12-19

## 2022-12-19 RX ORDER — AMOXICILLIN AND CLAVULANATE POTASSIUM 875; 125 MG/1; MG/1
1 TABLET, FILM COATED ORAL 2 TIMES DAILY
Qty: 14 TABLET | Refills: 0 | Status: SHIPPED | OUTPATIENT
Start: 2022-12-19 | End: 2022-12-26

## 2022-12-19 RX ORDER — IBUPROFEN 200 MG
600 TABLET ORAL
Status: COMPLETED | OUTPATIENT
Start: 2022-12-19 | End: 2022-12-19

## 2022-12-19 RX ORDER — CIPROFLOXACIN 500 MG/1
500 TABLET ORAL 2 TIMES DAILY
Qty: 14 TABLET | Refills: 0 | Status: SHIPPED | OUTPATIENT
Start: 2022-12-19 | End: 2022-12-19

## 2022-12-19 RX ORDER — AZITHROMYCIN 250 MG/1
TABLET, FILM COATED ORAL
Qty: 6 TABLET | Refills: 0 | Status: SHIPPED | OUTPATIENT
Start: 2022-12-19 | End: 2022-12-24

## 2022-12-19 RX ORDER — CEFTRIAXONE 1 G/1
1 INJECTION, POWDER, FOR SOLUTION INTRAMUSCULAR; INTRAVENOUS
Status: COMPLETED | OUTPATIENT
Start: 2022-12-19 | End: 2022-12-19

## 2022-12-19 RX ADMIN — LIDOCAINE HYDROCHLORIDE 1 ML: 10 INJECTION INFILTRATION; PERINEURAL at 07:12

## 2022-12-19 RX ADMIN — CEFTRIAXONE 1 G: 1 INJECTION, POWDER, FOR SOLUTION INTRAMUSCULAR; INTRAVENOUS at 07:12

## 2022-12-19 RX ADMIN — Medication 600 MG: at 06:12

## 2022-12-20 NOTE — PATIENT INSTRUCTIONS
Your xray shows a right sided pneumonia. You were given a shot of antibiotics here. You can start the oral antibiotics tomorrow morning.     Follow up visit with your doctor later this week in 3-4 days to check your response to antibiotics.    Your tests show blood in urine. We are running a culture to confirm if there is an infection. Drink plenty of fluids. We will contact you with urine results when available (usually 2-3 days). Motrin (ibuprofen) 600mg every 6 hours for fever and headache or Tylenol (acetaminophen) 1000mg every 4-6 hours for fever.     You need emergent re-evaluation if any persistent high fever > 101 not responding to antibiotics within 48 hours, lethargy, confusion, severe flank pain, shortness of breath, intractable vomiting or worsening symptoms.

## 2022-12-20 NOTE — PROGRESS NOTES
"Subjective:       Patient ID: Lexis Allen is a 62 y.o. female.    Vitals:  height is 5' 1" (1.549 m) and weight is 90.3 kg (199 lb). Her oral temperature is 102.9 °F (39.4 °C) (abnormal). Her blood pressure is 130/60 and her pulse is 117 (abnormal). Her respiration is 18 and oxygen saturation is 96%.     Chief Complaint: Fever (Headache)    Pt presents to the clinic today with fever and headache that began on Friday. High fever up to 103 for the past 4 days. Was here two days ago with negative flu and covid test. Denies CP, SOB, CCC, N/V/D. Is c/o some urinary frequency but no dysuria or hematuria. Had one episode of sharp right flank pain a day ago that has resolved and has not reoccured    Fever   This is a new problem. The current episode started in the past 7 days. The problem occurs constantly. The problem has been gradually worsening. The maximum temperature noted was 102 to 102.9 F. The temperature was taken using an oral thermometer. Associated symptoms include headaches and muscle aches. Pertinent negatives include no abdominal pain, chest pain, congestion, coughing, diarrhea, nausea, rash, sore throat, urinary pain, vomiting or wheezing. She has tried NSAIDs for the symptoms. The treatment provided mild relief.     Constitution: Positive for chills, fatigue and fever.   HENT:  Negative for congestion and sore throat.    Neck: Negative for neck pain and neck stiffness.   Cardiovascular:  Negative for chest pain, leg swelling, palpitations and sob on exertion.   Eyes:  Negative for eye discharge, eye itching and eye pain.   Respiratory:  Negative for cough, sputum production, shortness of breath and wheezing.    Gastrointestinal:  Negative for abdominal pain, nausea, vomiting and diarrhea.   Genitourinary:  Positive for frequency and flank pain. Negative for dysuria, urgency, urine decreased, bed wetting and hematuria.   Musculoskeletal:  Positive for muscle ache.   Skin:  Negative for rash and wound. "   Neurological:  Positive for headaches.     Objective:      Physical Exam   Constitutional: She is oriented to person, place, and time. She appears well-developed. She is cooperative.  Non-toxic appearance. She does not appear ill. No distress.   HENT:   Head: Normocephalic and atraumatic.   Ears:   Right Ear: Hearing, tympanic membrane, external ear and ear canal normal.   Left Ear: Hearing, tympanic membrane, external ear and ear canal normal.   Nose: Nose normal. No mucosal edema, rhinorrhea, nasal deformity or congestion. No epistaxis. Right sinus exhibits no maxillary sinus tenderness and no frontal sinus tenderness. Left sinus exhibits no maxillary sinus tenderness and no frontal sinus tenderness.   Mouth/Throat: Uvula is midline, oropharynx is clear and moist and mucous membranes are normal. No trismus in the jaw. Normal dentition. No uvula swelling. No oropharyngeal exudate, posterior oropharyngeal edema or posterior oropharyngeal erythema.   Eyes: Conjunctivae and lids are normal. No scleral icterus.   Neck: Trachea normal and phonation normal. Neck supple. No edema present. No erythema present. No neck rigidity present.   Cardiovascular: Regular rhythm, normal heart sounds and normal pulses. Tachycardia present.   Pulmonary/Chest: Effort normal and breath sounds normal. No respiratory distress. She has no decreased breath sounds. She has no wheezes. She has no rhonchi. She has no rales. She exhibits no tenderness.   Abdominal: Normal appearance. There is no abdominal tenderness. There is no rebound, no guarding, no left CVA tenderness and no right CVA tenderness.   Musculoskeletal: Normal range of motion.         General: No deformity. Normal range of motion.   Lymphadenopathy:     She has no cervical adenopathy.   Neurological: She is alert and oriented to person, place, and time. She exhibits normal muscle tone. Coordination normal.   Skin: Skin is warm, dry, intact, not diaphoretic and not pale.    Psychiatric: Her speech is normal and behavior is normal. Judgment and thought content normal.   Nursing note and vitals reviewed.      Results for orders placed or performed in visit on 12/19/22   SARS Coronavirus 2 Antigen, POCT Manual Read   Result Value Ref Range    SARS Coronavirus 2 Antigen Negative Negative     Acceptable Yes    POCT Influenza A/B Molecular   Result Value Ref Range    POC Molecular Influenza A Ag Negative Negative, Not Reported    POC Molecular Influenza B Ag Negative Negative, Not Reported     Acceptable Yes    POCT Urinalysis, Dipstick, Automated, W/O Scope   Result Value Ref Range    POC Blood, Urine Positive (A) Negative    POC Bilirubin, Urine Negative Negative    POC Urobilinogen, Urine positive (A) 0.1 - 1.1    POC Ketones, Urine Negative Negative    POC Protein, Urine Positive (A) Negative    POC Nitrates, Urine Negative Negative    POC Glucose, Urine Negative Negative    pH, UA 9.0     POC Specific Gravity, Urine 1.005 1.003 - 1.029    POC Leukocytes, Urine Negative Negative     CXR shows right middle lobe pneumonia.      Assessment:       1. Fever, unspecified fever cause    2. Hematuria, unspecified type    3. Pneumonia of right middle lobe due to infectious organism          Plan:       Tx for CAP - is diabetic so given augmentin + azithromycin. Rocephin IM here.    UA neg for LE and nitrates - urine culture run.    Recommend f/u with PCP in 3 days to check response to abx. ER if any SOB, severe CP, lethargy, persistent high fever > 2 days not responding to abx.    Fever, unspecified fever cause  -     ibuprofen tablet 600 mg  -     SARS Coronavirus 2 Antigen, POCT Manual Read  -     POCT Influenza A/B Molecular  -     POCT Urinalysis, Dipstick, Automated, W/O Scope  -     CULTURE, URINE  -     X-Ray Chest PA And Lateral; Future; Expected date: 12/19/2022    Hematuria, unspecified type  -     cefTRIAXone injection 1 g    Pneumonia of right middle  lobe due to infectious organism  -     cefTRIAXone injection 1 g  -     LIDOcaine HCL 10 mg/ml (1%) injection 1 mL  -     amoxicillin-clavulanate 875-125mg (AUGMENTIN) 875-125 mg per tablet; Take 1 tablet by mouth 2 (two) times daily. for 7 days  Dispense: 14 tablet; Refill: 0  -     azithromycin (Z-ANN MARIE) 250 MG tablet; Take 2 tablets by mouth on day 1; Take 1 tablet by mouth on days 2-5  Dispense: 6 tablet; Refill: 0    Other orders  -     Discontinue: ciprofloxacin HCl (CIPRO) 500 MG tablet; Take 1 tablet (500 mg total) by mouth 2 (two) times daily. for 7 days  Dispense: 14 tablet; Refill: 0    Caroline Fusilier PA-C [facility]       Patient Instructions   Your xray shows a right sided pneumonia. You were given a shot of antibiotics here. You can start the oral antibiotics tomorrow morning.     Follow up visit with your doctor later this week in 3-4 days to check your response to antibiotics.    Your tests show blood in urine. We are running a culture to confirm if there is an infection. Drink plenty of fluids. We will contact you with urine results when available (usually 2-3 days). Motrin (ibuprofen) 600mg every 6 hours for fever and headache or Tylenol (acetaminophen) 1000mg every 4-6 hours for fever.     You need emergent re-evaluation if any persistent high fever > 101 not responding to antibiotics within 48 hours, lethargy, confusion, severe flank pain, shortness of breath, intractable vomiting or worsening symptoms.

## 2022-12-21 ENCOUNTER — TELEPHONE (OUTPATIENT)
Dept: URGENT CARE | Facility: CLINIC | Age: 62
End: 2022-12-21
Payer: COMMERCIAL

## 2022-12-21 ENCOUNTER — TELEPHONE (OUTPATIENT)
Dept: PRIMARY CARE CLINIC | Facility: CLINIC | Age: 62
End: 2022-12-21

## 2022-12-21 ENCOUNTER — OFFICE VISIT (OUTPATIENT)
Dept: PRIMARY CARE CLINIC | Facility: CLINIC | Age: 62
End: 2022-12-21
Payer: COMMERCIAL

## 2022-12-21 VITALS
BODY MASS INDEX: 38.4 KG/M2 | WEIGHT: 203.38 LBS | OXYGEN SATURATION: 97 % | TEMPERATURE: 98 F | DIASTOLIC BLOOD PRESSURE: 70 MMHG | HEART RATE: 79 BPM | SYSTOLIC BLOOD PRESSURE: 107 MMHG | HEIGHT: 61 IN

## 2022-12-21 DIAGNOSIS — J18.9 PNEUMONIA OF RIGHT UPPER LOBE DUE TO INFECTIOUS ORGANISM: Primary | ICD-10-CM

## 2022-12-21 DIAGNOSIS — E11.9 TYPE 2 DIABETES MELLITUS WITHOUT COMPLICATION, WITHOUT LONG-TERM CURRENT USE OF INSULIN: ICD-10-CM

## 2022-12-21 DIAGNOSIS — R50.9 FEVER, UNSPECIFIED FEVER CAUSE: ICD-10-CM

## 2022-12-21 PROCEDURE — 3078F DIAST BP <80 MM HG: CPT | Mod: CPTII,S$GLB,, | Performed by: FAMILY MEDICINE

## 2022-12-21 PROCEDURE — 3008F PR BODY MASS INDEX (BMI) DOCUMENTED: ICD-10-PCS | Mod: CPTII,S$GLB,, | Performed by: FAMILY MEDICINE

## 2022-12-21 PROCEDURE — 3066F NEPHROPATHY DOC TX: CPT | Mod: CPTII,S$GLB,, | Performed by: FAMILY MEDICINE

## 2022-12-21 PROCEDURE — 99999 PR PBB SHADOW E&M-EST. PATIENT-LVL IV: CPT | Mod: PBBFAC,,,

## 2022-12-21 PROCEDURE — 3044F HG A1C LEVEL LT 7.0%: CPT | Mod: CPTII,S$GLB,, | Performed by: FAMILY MEDICINE

## 2022-12-21 PROCEDURE — 3008F BODY MASS INDEX DOCD: CPT | Mod: CPTII,S$GLB,, | Performed by: FAMILY MEDICINE

## 2022-12-21 PROCEDURE — 3078F PR MOST RECENT DIASTOLIC BLOOD PRESSURE < 80 MM HG: ICD-10-PCS | Mod: CPTII,S$GLB,, | Performed by: FAMILY MEDICINE

## 2022-12-21 PROCEDURE — 99215 PR OFFICE/OUTPT VISIT, EST, LEVL V, 40-54 MIN: ICD-10-PCS | Mod: S$GLB,,, | Performed by: FAMILY MEDICINE

## 2022-12-21 PROCEDURE — 99999 PR PBB SHADOW E&M-EST. PATIENT-LVL IV: ICD-10-PCS | Mod: PBBFAC,,,

## 2022-12-21 PROCEDURE — 99215 OFFICE O/P EST HI 40 MIN: CPT | Mod: S$GLB,,, | Performed by: FAMILY MEDICINE

## 2022-12-21 PROCEDURE — 1159F MED LIST DOCD IN RCRD: CPT | Mod: CPTII,S$GLB,, | Performed by: FAMILY MEDICINE

## 2022-12-21 PROCEDURE — 3061F NEG MICROALBUMINURIA REV: CPT | Mod: CPTII,S$GLB,, | Performed by: FAMILY MEDICINE

## 2022-12-21 PROCEDURE — 1159F PR MEDICATION LIST DOCUMENTED IN MEDICAL RECORD: ICD-10-PCS | Mod: CPTII,S$GLB,, | Performed by: FAMILY MEDICINE

## 2022-12-21 PROCEDURE — 3074F SYST BP LT 130 MM HG: CPT | Mod: CPTII,S$GLB,, | Performed by: FAMILY MEDICINE

## 2022-12-21 PROCEDURE — 3066F PR DOCUMENTATION OF TREATMENT FOR NEPHROPATHY: ICD-10-PCS | Mod: CPTII,S$GLB,, | Performed by: FAMILY MEDICINE

## 2022-12-21 PROCEDURE — 3061F PR NEG MICROALBUMINURIA RESULT DOCUMENTED/REVIEW: ICD-10-PCS | Mod: CPTII,S$GLB,, | Performed by: FAMILY MEDICINE

## 2022-12-21 PROCEDURE — 3074F PR MOST RECENT SYSTOLIC BLOOD PRESSURE < 130 MM HG: ICD-10-PCS | Mod: CPTII,S$GLB,, | Performed by: FAMILY MEDICINE

## 2022-12-21 PROCEDURE — 3044F PR MOST RECENT HEMOGLOBIN A1C LEVEL <7.0%: ICD-10-PCS | Mod: CPTII,S$GLB,, | Performed by: FAMILY MEDICINE

## 2022-12-21 NOTE — PATIENT INSTRUCTIONS
Rest  Drink plenty of clear fluids  Nasal saline spray to clear nasal drainage and help with nasal congestion  Zyrtec or Claritin to help dry mucus and post nasal drip  Mucinex or Mucinex DM for cough and chest congestion  Tylenol or Ibuprofen for fever, headache and body aches  Warm salt water gargles for throat comfort  Chloraseptic spray or lozenges for throat comfort  RTC with no improvement or worsening     Should note continued improvement. If continued fevers, shortness of breath and worsened condition present to ER for evaluation.

## 2022-12-21 NOTE — ASSESSMENT & PLAN NOTE
Date of Service: 02/14/20    Carlos Bal is a 82 year old old male, admitted on 2/8/2020, who is s/p CABG    Chief Complaint / Reason for Visit Post Operative Visit   Significant Events Since Last Note No acute events;      OBJECTIVE   BP (!) 138/108   Pulse 94   Temp 98.1 °F (36.7 °C) (PA Catheter)   Resp (!) 23   Ht 5' 5\" (1.651 m)   Wt 80.6 kg (177 lb 11.1 oz)   BMI 29.57 kg/m²   BSA 1.88 m²    Gen: well developed, overly nourished, male  HEENT: - JVD, no bruits, neck supple  CV: regular s1s2, no overt m/r/g  Pulm: CTA P  Abd: soft, BS+, NT, ND  Neuro: alert, responsive, oriented  MS: moves all four limbs antigravity  Ext: warm, trace UE/LE edema  Skin: no rashes or lesions  Wound: Sternotomy, CT, EVH sites are c/d/i with no erythema or drainage    INTAKE/OUTPUT DATA  Date 02/13/20 0700 - 02/14/20 0659 02/14/20 0700 - 02/15/20 0659   Shift 6475-1291 4872-5735 8378-3643 24 Hour Total 9721-7582 0242-7548 1695-4593 24 Hour Total   INTAKE   P.O.   200 200       I.V.  1365.2 419.5 1784.6       Blood  1000  1000       IV Piggyback 50  250 300       Shift Total 50 2365.2 869.5 3284.6       OUTPUT   Urine 50        Chest Tube  100 180 280         Output (mL) (Chest Tube 02/13/20 #2 Mid)  100 180 280       Shift Total 50 700 1040 1790       Weight (kg) 80.6 80.6 80.6 80.6 80.6 80.6 80.6 80.6        CURRENT MEDICATIONS  • sodium chloride 0.9% infusion 20 mL/hr at 02/14/20 0559      • albumin human (SPA)       • acetaminophen  650 mg Oral Q8H   • mupirocin (BACTROBAN) 2% nasal ointment  1 application Each Nare 2 times per day   • docusate sodium-sennosides  2 tablet OG Tube Daily   • [START ON 2/15/2020] bisacodyl  10 mg Rectal Once   • [START ON 2/17/2020] polyethylene glycol  17 g OG Tube BID   • gabapentin  100 mg OG Tube 3 times per day   • famotidine  20 mg OG Tube Daily   • atorvastatin  80 mg Oral Nightly   • aspirin  325 mg OG Tube Daily   • enoxaparin  30 mg Subcutaneous QHS   • insulin  Appetite decreased currently  Has not started metformin due to diarrhea from present illness   lispro   Subcutaneous 4x Daily WC        LABORATORY STUDIES  Recent Labs     02/13/20  1557 02/13/20  1710 02/14/20  0405   WBC 19.9* 19.8* 11.5*   RBC 3.33* 4.66 3.86*   HGB 9.8* 13.6 11.2*   HCT 30.3* 41.9 34.5*   PLT 98* 96* 87*     Recent Labs     02/12/20  0643 02/13/20  0645 02/13/20  1710 02/14/20  0405   SODIUM 140 141 145 144   POTASSIUM 4.0 4.0 3.8 4.5   CHLORIDE 114* 114* 114* 118*   CO2 19* 21 20* 19*   BUN 21* 24* 23* 24*   CREATININE 1.65* 1.82* 1.67* 1.87*   MG 2.1  --  3.9* 3.2*   PHOS 3.2  --  2.8  --      Recent Labs     02/13/20  0645 02/13/20  1557 02/13/20  1710   PTT  --  28 27   INR 1.0 1.3 1.2       MICROBIOLOGY STUDIES  Microbiology Results     None           REVIEW OF RADIOLOGY AND CARDIAC STUDIES  I have reviewed and agree with the following:  n/a    ASSESSMENT & PLAN   82 year old old male s/p CABG who's doing well.    S/P CABG  - deline  - multimodal pain regimen  - hold diuretics - defer to Nephrology  - unable to tolerate Beta Blocker due to allergy ; start CCB    Anemia due to Acute Blood Loss  - no s/s bleeding    Hx of RCC, s/p Nephrectomy  - Nephro following    Chronic Kidney Disease, Stage III  - appears euvolemic  - follow urine output/weight    Dispo: Transfer    Terry James PA-C  [facility]

## 2022-12-21 NOTE — PROGRESS NOTES
Lexis Allen  12/21/2022  [id number]    Kavin Marti MD  Patient Care Team:  Kavin Marti MD as PCP - General (Internal Medicine)      [facility]      Chief Complaint:  Cough, fever      History of Present Illness:  Here for F/U of RUL pneumonia diagnosed on 12/19/22 at urgent care and prescribed Augmentin and Azithromycin.   Onset of symptoms x 6 days  Continued with 103 fever at night with chills noted last night  Denies further fever today  Initially, noted pain with deep breaths to right flank area.  Tearful in clinic today.    States she feels approx 30 % better than earlier this week  Has taken 2 days worth of Augmentin and Azithromycin  Denies significant cough with production of mucus. Has had mild cough.   Denies SOB, further right flank pain, MITCHELL  C/O lightness  Decreased appetite  Bad headaches - resolved today  Mild diarrhea - has not started metformin yet   Pt states that she is drinking fluids.   Saddened mood due to not feeling well      Review of Systems   Constitutional:  Positive for chills, fever and malaise/fatigue. Negative for diaphoresis and weight loss.   HENT:  Negative for congestion, ear discharge, ear pain, sinus pain and sore throat.    Eyes: Negative.    Respiratory:  Positive for cough. Negative for sputum production and shortness of breath.    Cardiovascular:  Negative for chest pain.   Gastrointestinal:  Negative for abdominal pain, constipation, nausea and vomiting.   Genitourinary:  Negative for dysuria, frequency and hematuria.   Musculoskeletal: Negative.    Skin: Negative.    Neurological:  Positive for weakness and headaches.   Psychiatric/Behavioral:  The patient is nervous/anxious.         Momentary sadness due to illness       The following were reviewed: Active problem list, medication list, allergies, family history, social history, and Health Maintenance.     History:  Past Medical History:   Diagnosis Date    Diabetes mellitus, type 2      Hyperlipidemia     Kidney stone     Pneumonia, unspecified organism      Past Surgical History:   Procedure Laterality Date     SECTION      LITHOTRIPSY       Family History   Problem Relation Age of Onset    Diabetes Mother     Diabetes Father     Diabetes Sister     Diabetes Brother      Patient Active Problem List   Diagnosis    Class 2 severe obesity due to excess calories with serious comorbidity and body mass index (BMI) of 38.0 to 38.9 in adult    Type 2 diabetes mellitus without complication, without long-term current use of insulin    History of kidney stones    Mixed hyperlipidemia    Healthcare maintenance    Fever    Pneumonia of right upper lobe due to infectious organism     Review of patient's allergies indicates:   Allergen Reactions    Sulfamethoxazole-trimethoprim Rash       Medications:  Current Outpatient Medications on File Prior to Visit   Medication Sig Dispense Refill    amoxicillin-clavulanate 875-125mg (AUGMENTIN) 875-125 mg per tablet Take 1 tablet by mouth 2 (two) times daily. for 7 days 14 tablet 0    azithromycin (Z-ANN MARIE) 250 MG tablet Take 2 tablets by mouth on day 1; Take 1 tablet by mouth on days 2-5 6 tablet 0    metFORMIN (GLUCOPHAGE-XR) 500 MG ER 24hr tablet Take 1 tablet (500 mg total) by mouth daily with breakfast. 90 tablet 3    rosuvastatin (CRESTOR) 10 MG tablet Take 1 tablet (10 mg total) by mouth once daily. 90 tablet 3     No current facility-administered medications on file prior to visit.       Medications have been reviewed and reconciled with patient at visit today.    Barriers to medications present (no )    Fall since last office visit (no )      Exam:  Vitals:    22 1319   BP: 107/70   Pulse: 79   Temp: 98.2 °F (36.8 °C)     Weight: 92.3 kg (203 lb 6.4 oz)   Body mass index is 38.43 kg/m².      BP Readings from Last 3 Encounters:   22 107/70   22 130/60   22 130/80     Wt Readings from Last 3 Encounters:   22 1319 92.3 kg (203 lb  6.4 oz)   12/19/22 1846 90.3 kg (199 lb)   12/17/22 1610 90.3 kg (199 lb)            Physical Exam  Vitals reviewed.   Constitutional:       Appearance: Normal appearance. She is well-developed. She is obese. She is ill-appearing (mildly).   HENT:      Head: Normocephalic and atraumatic.      Right Ear: There is impacted cerumen.      Left Ear: There is impacted cerumen.      Nose: Nose normal. No congestion.      Right Turbinates: Not enlarged or swollen.      Left Turbinates: Not enlarged or swollen.      Mouth/Throat:      Mouth: Mucous membranes are moist.      Pharynx: No oropharyngeal exudate, posterior oropharyngeal erythema or uvula swelling.   Eyes:      General: No scleral icterus.     Conjunctiva/sclera: Conjunctivae normal.   Cardiovascular:      Rate and Rhythm: Normal rate and regular rhythm.      Heart sounds: Normal heart sounds. No murmur heard.    No friction rub. No gallop.   Pulmonary:      Effort: Pulmonary effort is normal.      Breath sounds: Decreased breath sounds (mildly) present. No wheezing or rhonchi.   Abdominal:      General: Bowel sounds are normal. There is no distension.      Palpations: Abdomen is soft.      Tenderness: There is no abdominal tenderness.   Musculoskeletal:         General: No tenderness. Normal range of motion.      Cervical back: Normal range of motion and neck supple.   Skin:     General: Skin is warm and dry.      Coloration: Skin is not pale.   Neurological:      Mental Status: She is alert and oriented to person, place, and time. Mental status is at baseline.   Psychiatric:         Behavior: Behavior normal.         Thought Content: Thought content normal.         Judgment: Judgment normal.       Laboratory Reviewed: (Yes)  Lab Results   Component Value Date    WBC 6.99 04/22/2004    HGB 12.4 04/22/2004    HCT 38.3 04/22/2004     (H) 04/22/2004    CHOL 213 (H) 12/14/2022    TRIG 179 (H) 12/14/2022    HDL 35 (L) 12/14/2022    ALT 35 08/26/2011    AST 29  08/26/2011     12/14/2022    K 4.4 12/14/2022     12/14/2022    CREATININE 0.7 12/14/2022    BUN 13 12/14/2022    CO2 23 12/14/2022    TSH 1.42 08/02/2010    GLUF 115 (H) 04/12/2005    HGBA1C 6.2 (H) 12/14/2022           Health Maintenance  Health Maintenance Topics with due status: Not Due       Topic Last Completion Date    Diabetes Urine Screening 12/14/2022    Lipid Panel 12/14/2022    Hemoglobin A1c 12/14/2022    TETANUS VACCINE 12/16/2022    Foot Exam 12/16/2022    Low Dose Statin 12/21/2022     Health Maintenance Due   Topic Date Due    Hepatitis C Screening  Never done    Cervical Cancer Screening  Never done    Pneumococcal Vaccines (Age 0-64) (1 - PCV) Never done    Eye Exam  Never done    HIV Screening  Never done    Mammogram  Never done    Shingles Vaccine (1 of 2) Never done    Colorectal Cancer Screening  01/01/2015    COVID-19 Vaccine (4 - Booster for Pfizer series) 01/17/2022    Influenza Vaccine (1) Never done       Assessment and Plan:  1. Pneumonia of right upper lobe due to infectious organism  Assessment & Plan:  Seen briefly by Dr. Marti  Continue Augmentin and Azithromycin and complete entire course  Rest, drink plenty of fluids  Drink warm fluids to help loosen mucus  Mucinex for thinning of mucus  Pneumonia vaccine in the near future  If SOB, continued high fevers, not feeling better, call office or present to the ER for evaluation      2. Type 2 diabetes mellitus without complication, without long-term current use of insulin  Assessment & Plan:  Appetite decreased currently  Has not started metformin due to diarrhea from present illness      3. Fever, unspecified fever cause  Assessment & Plan:  Drink fluids  Treat fever with alternating Tylenol and Ibuprofen                   -Patient's lab results were reviewed and discussed with patient  -Treatment options and alternatives were discussed with the patient. Patient expressed understanding. Patient was given the opportunity to  ask questions and be an active participant in their medical care. Patient had no further questions or concerns at this time.   -Patient is an overall moderate risk for health complications from their medical conditions.       Follow up: No follow-ups on file.      After visit summary printed and given to patient upon discharge.  Patient goals and care plan are included in After visit summary.    Total medical decision making time was 40 min.    The following issues were discussed: The primary encounter diagnosis was Pneumonia of right upper lobe due to infectious organism. Diagnoses of Type 2 diabetes mellitus without complication, without long-term current use of insulin and Fever, unspecified fever cause were also pertinent to this visit.    Health maintenance needs, recent test results and goals of care discussed with pt and questions answered.      RITA Dinero, NP-C

## 2022-12-21 NOTE — ASSESSMENT & PLAN NOTE
Seen briefly by Dr. Marti  Continue Augmentin and Azithromycin and complete entire course  Rest, drink plenty of fluids  Drink warm fluids to help loosen mucus  Mucinex for thinning of mucus  Pneumonia vaccine in the near future  If SOB, continued high fevers, not feeling better, call office or present to the ER for evaluation

## 2022-12-22 ENCOUNTER — TELEPHONE (OUTPATIENT)
Dept: URGENT CARE | Facility: CLINIC | Age: 62
End: 2022-12-22
Payer: COMMERCIAL

## 2022-12-22 LAB — BACTERIA UR CULT: ABNORMAL

## 2022-12-22 NOTE — TELEPHONE ENCOUNTER
Spoke with patient regarding positive urine culture.  Patient on appropriate antibiotics at this time for pneumonia which will also cover for E coli infection.  Patient informed of side effects of antibiotics which can cause diarrhea.  Discussed increase fluids at this time to prevent dehydration.  Patient verbalizes understanding and agrees with plan.  Return to clinic if symptoms worsen, change, or persist.

## 2023-03-20 PROBLEM — Z00.00 HEALTHCARE MAINTENANCE: Status: RESOLVED | Noted: 2022-12-16 | Resolved: 2023-03-20

## 2023-03-27 PROBLEM — J18.9 PNEUMONIA OF RIGHT UPPER LOBE DUE TO INFECTIOUS ORGANISM: Chronic | Status: RESOLVED | Noted: 2022-12-21 | Resolved: 2023-03-27

## 2023-12-18 DIAGNOSIS — E11.9 TYPE 2 DIABETES MELLITUS WITHOUT COMPLICATION, WITHOUT LONG-TERM CURRENT USE OF INSULIN: ICD-10-CM

## 2023-12-18 DIAGNOSIS — Z87.442 HISTORY OF KIDNEY STONES: ICD-10-CM

## 2023-12-18 DIAGNOSIS — E78.2 MIXED HYPERLIPIDEMIA: Primary | ICD-10-CM

## 2023-12-19 ENCOUNTER — TELEPHONE (OUTPATIENT)
Dept: PRIMARY CARE CLINIC | Facility: CLINIC | Age: 63
End: 2023-12-19
Payer: COMMERCIAL

## 2023-12-19 NOTE — TELEPHONE ENCOUNTER
Called pt to schedule lab appt in [place] no answer LVM to call [phone] to schedule appt

## 2023-12-22 ENCOUNTER — LAB VISIT (OUTPATIENT)
Dept: LAB | Facility: HOSPITAL | Age: 63
End: 2023-12-22
Attending: INTERNAL MEDICINE
Payer: COMMERCIAL

## 2023-12-22 DIAGNOSIS — E11.9 TYPE 2 DIABETES MELLITUS WITHOUT COMPLICATION, WITHOUT LONG-TERM CURRENT USE OF INSULIN: ICD-10-CM

## 2023-12-22 DIAGNOSIS — E78.2 MIXED HYPERLIPIDEMIA: ICD-10-CM

## 2023-12-22 DIAGNOSIS — Z87.442 HISTORY OF KIDNEY STONES: ICD-10-CM

## 2023-12-22 LAB
ALBUMIN/CREAT UR: 21.4 UG/MG (ref 0–30)
ANION GAP SERPL CALC-SCNC: 11 MMOL/L (ref 8–16)
BUN SERPL-MCNC: 14 MG/DL (ref 8–23)
CALCIUM SERPL-MCNC: 9.6 MG/DL (ref 8.7–10.5)
CHLORIDE SERPL-SCNC: 108 MMOL/L (ref 95–110)
CHOLEST SERPL-MCNC: 246 MG/DL (ref 120–199)
CHOLEST/HDLC SERPL: 5.5 {RATIO} (ref 2–5)
CO2 SERPL-SCNC: 23 MMOL/L (ref 23–29)
CREAT SERPL-MCNC: 0.7 MG/DL (ref 0.5–1.4)
CREAT UR-MCNC: 103 MG/DL (ref 15–325)
EST. GFR  (NO RACE VARIABLE): >60 ML/MIN/1.73 M^2
ESTIMATED AVG GLUCOSE: 166 MG/DL (ref 68–131)
GLUCOSE SERPL-MCNC: 159 MG/DL (ref 70–110)
HBA1C MFR BLD: 7.4 % (ref 4–5.6)
HDLC SERPL-MCNC: 45 MG/DL (ref 40–75)
HDLC SERPL: 18.3 % (ref 20–50)
LDLC SERPL CALC-MCNC: 154.6 MG/DL (ref 63–159)
MICROALBUMIN UR DL<=1MG/L-MCNC: 22 UG/ML
NONHDLC SERPL-MCNC: 201 MG/DL
POTASSIUM SERPL-SCNC: 5 MMOL/L (ref 3.5–5.1)
PTH-INTACT SERPL-MCNC: 117.5 PG/ML (ref 9–77)
SODIUM SERPL-SCNC: 142 MMOL/L (ref 136–145)
TRIGL SERPL-MCNC: 232 MG/DL (ref 30–150)

## 2023-12-22 PROCEDURE — 83970 ASSAY OF PARATHORMONE: CPT | Performed by: INTERNAL MEDICINE

## 2023-12-22 PROCEDURE — 80061 LIPID PANEL: CPT | Performed by: INTERNAL MEDICINE

## 2023-12-22 PROCEDURE — 82043 UR ALBUMIN QUANTITATIVE: CPT | Performed by: INTERNAL MEDICINE

## 2023-12-22 PROCEDURE — 83036 HEMOGLOBIN GLYCOSYLATED A1C: CPT | Performed by: INTERNAL MEDICINE

## 2023-12-22 PROCEDURE — 80048 BASIC METABOLIC PNL TOTAL CA: CPT | Performed by: INTERNAL MEDICINE

## 2023-12-22 PROCEDURE — 36415 COLL VENOUS BLD VENIPUNCTURE: CPT | Mod: PN | Performed by: INTERNAL MEDICINE

## 2023-12-27 ENCOUNTER — TELEPHONE (OUTPATIENT)
Dept: PRIMARY CARE CLINIC | Facility: CLINIC | Age: 63
End: 2023-12-27
Payer: COMMERCIAL

## 2023-12-27 NOTE — TELEPHONE ENCOUNTER
Called pt to schedule appt with Dr. Marti @ [facility] no answer LVM to call [phone] to schedule appt

## 2023-12-27 NOTE — TELEPHONE ENCOUNTER
----- Message from Kavin Marti MD sent at 12/26/2023  3:05 PM CST -----  Regarding: RE: appt at [facility] in   Can you ask Sabrina?  ----- Message -----  From: Sheyla Barajas MA  Sent: 12/26/2023  11:51 AM CST  To: Kavin Marti MD  Subject: RE: appt at [facility] in                         I do not have access to the  clinic   ----- Message -----  From: Kavin Marti MD  Sent: 12/26/2023  10:49 AM CST  To: Sheyla Barajas MA  Subject: appt at [facility] in                             Sheyla, can you please call this patient and see if you can set her up for an appointment with me at our [facility] in [place]?  Make it for a Thursday afternoon in January if possible if that works for her schedule.

## 2023-12-27 NOTE — TELEPHONE ENCOUNTER
----- Message from Kavin Marti MD sent at 12/26/2023  3:05 PM CST -----  Regarding: RE: appt at [facility] in   Can you ask Sabrina?  ----- Message -----  From: Sheyla Barajas MA  Sent: 12/26/2023  11:51 AM CST  To: Kavin Marti MD  Subject: RE: appt at [facility] in                         I do not have access to the  clinic   ----- Message -----  From: Kavin Marti MD  Sent: 12/26/2023  10:49 AM CST  To: Sheyla Barajas MA  Subject: appt at [facility] in                             Sheyla, can you please call this patient and see if you can set her up for an appointment with me at our [facility] in [place]?  Make it for a Thursday afternoon in January if possible if that works for her schedule.

## 2024-03-07 ENCOUNTER — OFFICE VISIT (OUTPATIENT)
Dept: PRIMARY CARE CLINIC | Facility: CLINIC | Age: 64
End: 2024-03-07
Payer: COMMERCIAL

## 2024-03-07 VITALS
SYSTOLIC BLOOD PRESSURE: 138 MMHG | DIASTOLIC BLOOD PRESSURE: 86 MMHG | BODY MASS INDEX: 40.59 KG/M2 | WEIGHT: 214.81 LBS

## 2024-03-07 DIAGNOSIS — Z12.4 SCREENING FOR CERVICAL CANCER: ICD-10-CM

## 2024-03-07 DIAGNOSIS — R03.0 ELEVATED BLOOD PRESSURE READING: ICD-10-CM

## 2024-03-07 DIAGNOSIS — Z00.00 HEALTHCARE MAINTENANCE: ICD-10-CM

## 2024-03-07 DIAGNOSIS — R79.89 ELEVATED PARATHYROID HORMONE: ICD-10-CM

## 2024-03-07 DIAGNOSIS — Z87.442 HISTORY OF KIDNEY STONES: ICD-10-CM

## 2024-03-07 DIAGNOSIS — E78.2 MIXED HYPERLIPIDEMIA: ICD-10-CM

## 2024-03-07 DIAGNOSIS — E11.9 TYPE 2 DIABETES MELLITUS WITHOUT COMPLICATION, WITHOUT LONG-TERM CURRENT USE OF INSULIN: Primary | ICD-10-CM

## 2024-03-07 DIAGNOSIS — Z12.31 ENCOUNTER FOR SCREENING MAMMOGRAM FOR MALIGNANT NEOPLASM OF BREAST: ICD-10-CM

## 2024-03-07 DIAGNOSIS — E66.01 CLASS 2 SEVERE OBESITY DUE TO EXCESS CALORIES WITH SERIOUS COMORBIDITY AND BODY MASS INDEX (BMI) OF 38.0 TO 38.9 IN ADULT: ICD-10-CM

## 2024-03-07 PROBLEM — R50.9 FEVER: Status: RESOLVED | Noted: 2022-12-21 | Resolved: 2024-03-07

## 2024-03-07 PROCEDURE — 3079F DIAST BP 80-89 MM HG: CPT | Mod: CPTII,S$GLB,, | Performed by: INTERNAL MEDICINE

## 2024-03-07 PROCEDURE — 99999 PR PBB SHADOW E&M-EST. PATIENT-LVL IV: CPT | Mod: PBBFAC,,, | Performed by: INTERNAL MEDICINE

## 2024-03-07 PROCEDURE — 3008F BODY MASS INDEX DOCD: CPT | Mod: CPTII,S$GLB,, | Performed by: INTERNAL MEDICINE

## 2024-03-07 PROCEDURE — 3075F SYST BP GE 130 - 139MM HG: CPT | Mod: CPTII,S$GLB,, | Performed by: INTERNAL MEDICINE

## 2024-03-07 PROCEDURE — 90677 PCV20 VACCINE IM: CPT | Mod: S$GLB,,, | Performed by: INTERNAL MEDICINE

## 2024-03-07 PROCEDURE — 1159F MED LIST DOCD IN RCRD: CPT | Mod: CPTII,S$GLB,, | Performed by: INTERNAL MEDICINE

## 2024-03-07 PROCEDURE — 1160F RVW MEDS BY RX/DR IN RCRD: CPT | Mod: CPTII,S$GLB,, | Performed by: INTERNAL MEDICINE

## 2024-03-07 PROCEDURE — 90471 IMMUNIZATION ADMIN: CPT | Mod: S$GLB,,, | Performed by: INTERNAL MEDICINE

## 2024-03-07 PROCEDURE — 99214 OFFICE O/P EST MOD 30 MIN: CPT | Mod: 25,S$GLB,, | Performed by: INTERNAL MEDICINE

## 2024-03-07 NOTE — ASSESSMENT & PLAN NOTE
Lab Results   Component Value Date    .5 (H) 12/22/2023    CALCIUM 9.6 12/22/2023     Concern for hyperparathyroidism is heightened  --see elevated PTH for A/P

## 2024-03-07 NOTE — ASSESSMENT & PLAN NOTE
She would be a great candidate for a GLP1RA if we can get her to overcome her fear of medication (she saw a legal add for a class action lawsuit saying they can cause stomach paralysis)

## 2024-03-07 NOTE — PATIENT INSTRUCTIONS
Tolerating Metformin    Common side effects of metformin intolerance include nausea, diarrhea, vomiting and abdominal pain. A properly balanced meal/snack containing protein and carbohydrates can help improve tolerance of metformin and reduce some of the above symptoms.     Pairing protein and carbohydrate foods together at the time you take your medication is key. Protein foods break down slowly and slow down the digestion of carbohydrates. Therefore, keeping your stomach full for longer periods of time. Protein food sources include eggs, cheese, turkey, chicken, beef, pork, seafood, greek yogurt and nuts. The most nutrient-dense carbohydrates include fruits, vegetables and beans.     As Metformin is often taken in the morning, a balanced breakfast of protein and carbohydrates will aid in tolerance. Below are some breakfast and snack options:    -2 eggs with a serving of fruit   -Banana or apple with peanut butter/almond butter  -Greek yogurt topped with fruit or nuts of choice  -String cheese and grapes  -Handful nuts (any kind) with fruit  -Toast with 2 Tbsp peanut butter/almond butter  -Cottage cheese with peaches  -Chicken salad with veggie dippers  -Protein supplements (bars/shakes/smoothies)     *It is best to avoid high carb and high fat (greasy) meals when taking metformin...and NEVER take on an empty stomach.

## 2024-03-07 NOTE — ASSESSMENT & PLAN NOTE
Lab Results   Component Value Date     .5 (H) 12/22/2023     CALCIUM 9.6 12/22/2023      BMP        Lab Results   Component Value Date      12/22/2023     K 5.0 12/22/2023      12/22/2023     CO2 23 12/22/2023     BUN 14 12/22/2023     CREATININE 0.7 12/22/2023     CALCIUM 9.6 12/22/2023     ANIONGAP 11 12/22/2023     EGFRNORACEVR >60.0 12/22/2023   Elevated PTH indicative of hyperparathyroidism -- primary versus secondary  --Normocalcemic  --normal eGFR  --no history of DEXA, but would go ahead and check considering her elevated PTH  +history of kidney stones (? Type)    **check 24 urine for calcium  **check vitamin D level

## 2024-03-07 NOTE — PROGRESS NOTES
Patient ID: Lexis Allen is a 63 y.o. female.    Chief Complaint: No chief complaint on file.      HPI:    HPI   Here for follow-up.  Her A1c is going up and she never started taking metformin or crestor.  Has also not yet signed up for MyChart. Her weight had gone down to 199 lbs, but now has crept back up to 214 lbs.     No acute concerns or problems.      Review of Systems   Constitutional:  Negative for chills, fever and weight loss.   HENT:  Negative for hearing loss.    Respiratory:  Negative for cough and shortness of breath.    Cardiovascular:  Negative for chest pain and palpitations.   Gastrointestinal:  Negative for abdominal pain, diarrhea, heartburn, nausea and vomiting.   Musculoskeletal:  Negative for myalgias.   Neurological:  Negative for headaches.       Vital Signs  /86   Wt 97.4 kg (214 lb 13.4 oz)   BMI 40.59 kg/m²   Physical Exam  Physical Exam  Vitals and nursing note reviewed.   Constitutional:       Appearance: Normal appearance.   HENT:      Head: Normocephalic.      Right Ear: Tympanic membrane normal.      Left Ear: Tympanic membrane normal.   Eyes:      General: No scleral icterus.     Conjunctiva/sclera: Conjunctivae normal.   Cardiovascular:      Rate and Rhythm: Normal rate and regular rhythm.      Heart sounds: Normal heart sounds. No murmur heard.     No gallop.   Pulmonary:      Effort: Pulmonary effort is normal.      Breath sounds: Normal breath sounds.   Abdominal:      Palpations: Abdomen is soft.      Tenderness: There is no abdominal tenderness.   Musculoskeletal:      Right lower leg: No edema.      Left lower leg: No edema.   Skin:     General: Skin is warm and dry.   Neurological:      Mental Status: She is alert and oriented to person, place, and time. Mental status is at baseline.   Psychiatric:         Mood and Affect: Mood normal.         Behavior: Behavior normal.         Thought Content: Thought content normal.     Protective Sensation (w/ 10 gram  monofilament):  Right: Intact  Left: Intact    Visual Inspection:  Normal -  Bilateral    Pedal Pulses:   Right: Present  Left: Present    Posterior Tibialis Pulses:   Right:Present  Left: Present     Hemoglobin A1C   Date Value Ref Range Status   12/22/2023 7.4 (H) 4.0 - 5.6 % Final     Comment:     ADA Screening Guidelines:  5.7-6.4%  Consistent with prediabetes  >or=6.5%  Consistent with diabetes    High levels of fetal hemoglobin interfere with the HbA1C  assay. Heterozygous hemoglobin variants (HbS, HgC, etc)do  not significantly interfere with this assay.   However, presence of multiple variants may affect accuracy.     12/14/2022 6.2 (H) 4.0 - 5.6 % Final     Comment:     ADA Screening Guidelines:  5.7-6.4%  Consistent with prediabetes  >or=6.5%  Consistent with diabetes    High levels of fetal hemoglobin interfere with the HbA1C  assay. Heterozygous hemoglobin variants (HbS, HgC, etc)do  not significantly interfere with this assay.   However, presence of multiple variants may affect accuracy.     11/12/2021 6.9 (H) 4.2 - 5.8 % Final   08/26/2011 6.2 4.0 - 6.2 % Final       Chemistry        Component Value Date/Time     12/22/2023 0904    K 5.0 12/22/2023 0904     12/22/2023 0904    CO2 23 12/22/2023 0904    BUN 14 12/22/2023 0904    CREATININE 0.7 12/22/2023 0904     (H) 12/22/2023 0904        Component Value Date/Time    CALCIUM 9.6 12/22/2023 0904    AST 29 08/26/2011 0953    ALT 17 11/12/2021 1147    ALT 35 08/26/2011 0953    ESTGFRAFRICA 116 11/12/2021 1147            Assessment/Plan  1. Type 2 diabetes mellitus without complication, without long-term current use of insulin  Overview:      No albuminuria as of 12/22/23  -due for foot exam (no neuropathy) -- done today  -due for eye exam (no retinopathy) -- needs diabetic eye photo or optometry eval    Assessment & Plan:  A1c increased to 7.4  Never started metformin, so she is not taking any medications currently and is trying to  control her diabetes with diet alone    Lab Results   Component Value Date    HGBA1C 7.4 (H) 12/22/2023     **she agrees to resume metformin  mg daily  **will plan to start Ozempic in the near future, once she is tolerating metformin      Orders:  -     Diabetic Eye Screening Photo; Future  -     (In Office Administered) Pneumococcal Conjugate Vaccine (20 Valent) (IM) (Preferred)  -     Ambulatory referral/consult to Diabetes Education; Future; Expected date: 03/14/2024  -     Ambulatory referral/consult to Nutrition Services; Future; Expected date: 03/14/2024  -     Microalbumin/Creatinine Ratio, Urine; Future    2. Class 2 severe obesity due to excess calories with serious comorbidity and body mass index (BMI) of 38.0 to 38.9 in adult  Overview:  Baseline weight 201 lbs as of 12/2021  Weight 203 lbs as of 12/2022  Weight 214 lbs as of 3/7/24    Assessment & Plan:  She would be a great candidate for a GLP1RA if we can get her to overcome her fear of medication (she saw a legal add for a class action lawsuit saying they can cause stomach paralysis)      3. Mixed hyperlipidemia  Assessment & Plan:  Has not begin statin therapy yet because of fear of medication  She really needs high intensity statin therapy with goal of achieving LDL < 70 if possible (she does not have known CVD but is at very high risk based on age, hyperlipidemia, DM, and obesity)    Lab Results   Component Value Date    LDLCALC 154.6 12/22/2023     The 10-year ASCVD risk score (Lory SANTA, et al., 2019) is: 15.4%    Values used to calculate the score:      Age: 63 years      Sex: Female      Is Non- : No      Diabetic: Yes      Tobacco smoker: No      Systolic Blood Pressure: 156 mmHg      Is BP treated: No      HDL Cholesterol: 45 mg/dL      Total Cholesterol: 246 mg/dL     **she agrees to start crestor 10 mg daily      4. History of kidney stones  Assessment & Plan:  Lab Results   Component Value Date    .5  (H) 12/22/2023    CALCIUM 9.6 12/22/2023     Concern for hyperparathyroidism is heightened  --see elevated PTH for A/P      5. Elevated parathyroid hormone  Assessment & Plan:        Lab Results   Component Value Date     .5 (H) 12/22/2023     CALCIUM 9.6 12/22/2023      BMP        Lab Results   Component Value Date      12/22/2023     K 5.0 12/22/2023      12/22/2023     CO2 23 12/22/2023     BUN 14 12/22/2023     CREATININE 0.7 12/22/2023     CALCIUM 9.6 12/22/2023     ANIONGAP 11 12/22/2023     EGFRNORACEVR >60.0 12/22/2023   Elevated PTH indicative of hyperparathyroidism -- primary versus secondary  --Normocalcemic  --normal eGFR  --no history of DEXA, but would go ahead and check considering her elevated PTH  +history of kidney stones (? Type)    **check 24 urine for calcium  **check vitamin D level    Orders:  -     Calcium, Timed Urine [facility]; 24 Hours; Future  -     Misc Sendout Test, Blood Vitamin D; Future; Expected date: 03/07/2024  -     DXA Bone Density Axial Skeleton 1 or more sites; Future; Expected date: 03/07/2024    6. Healthcare maintenance  Assessment & Plan:  Needs PCV20  Due for cervical CA screening  Due for mammogram  Due for diabetic foot exam and eye exam  Due for Shingles vaccine  Due for one time HIV and Hep C screening with next set of labs    Orders:  -     (In Office Administered) Pneumococcal Conjugate Vaccine (20 Valent) (IM) (Preferred)    7. Screening for cervical cancer  Assessment & Plan:  **refer to GYN    Orders:  -     Ambulatory referral/consult to Gynecology; Future; Expected date: 03/14/2024    8. Encounter for screening mammogram for malignant neoplasm of breast  Assessment & Plan:  **mammogram ordered 3/7/24    Orders:  -     Mammo Digital Screening Bilat; Future; Expected date: 03/07/2024    9. Elevated blood pressure reading  Assessment & Plan:  BP high today  **will need to monitor closely  **checking UACR        Follow up in about 3 months (around  6/7/2024).    Kavin Marti MD/[facility]  [facility] Medicine  [facility]

## 2024-03-07 NOTE — ASSESSMENT & PLAN NOTE
Has not begin statin therapy yet because of fear of medication  She really needs high intensity statin therapy with goal of achieving LDL < 70 if possible (she does not have known CVD but is at very high risk based on age, hyperlipidemia, DM, and obesity)    Lab Results   Component Value Date    LDLCALC 154.6 12/22/2023     The 10-year ASCVD risk score (Lory SANTA, et al., 2019) is: 15.4%    Values used to calculate the score:      Age: 63 years      Sex: Female      Is Non- : No      Diabetic: Yes      Tobacco smoker: No      Systolic Blood Pressure: 156 mmHg      Is BP treated: No      HDL Cholesterol: 45 mg/dL      Total Cholesterol: 246 mg/dL     **she agrees to start crestor 10 mg daily

## 2024-03-07 NOTE — ASSESSMENT & PLAN NOTE
A1c increased to 7.4  Never started metformin, so she is not taking any medications currently and is trying to control her diabetes with diet alone    Lab Results   Component Value Date    HGBA1C 7.4 (H) 12/22/2023     **she agrees to resume metformin  mg daily  **will plan to start Ozempic in the near future, once she is tolerating metformin

## 2024-03-07 NOTE — ASSESSMENT & PLAN NOTE
Needs PCV20  Due for cervical CA screening  Due for mammogram  Due for diabetic foot exam and eye exam  Due for Shingles vaccine  Due for one time HIV and Hep C screening with next set of labs

## 2024-03-11 ENCOUNTER — LAB VISIT (OUTPATIENT)
Dept: LAB | Facility: HOSPITAL | Age: 64
End: 2024-03-11
Attending: INTERNAL MEDICINE
Payer: COMMERCIAL

## 2024-03-11 ENCOUNTER — TELEPHONE (OUTPATIENT)
Dept: OBSTETRICS AND GYNECOLOGY | Facility: CLINIC | Age: 64
End: 2024-03-11
Payer: COMMERCIAL

## 2024-03-11 DIAGNOSIS — E11.9 TYPE 2 DIABETES MELLITUS WITHOUT COMPLICATION, WITHOUT LONG-TERM CURRENT USE OF INSULIN: ICD-10-CM

## 2024-03-11 LAB
ALBUMIN/CREAT UR: 9.2 UG/MG (ref 0–30)
CREAT UR-MCNC: 87 MG/DL (ref 15–325)
MICROALBUMIN UR DL<=1MG/L-MCNC: 8 UG/ML

## 2024-03-11 PROCEDURE — 82043 UR ALBUMIN QUANTITATIVE: CPT | Performed by: INTERNAL MEDICINE

## 2024-03-11 NOTE — TELEPHONE ENCOUNTER
Lvm for pt to return call to get appointment scheduled with our department.   Referral placed for Annual exam.

## 2024-03-13 DIAGNOSIS — E11.9 TYPE 2 DIABETES MELLITUS WITHOUT COMPLICATION, WITHOUT LONG-TERM CURRENT USE OF INSULIN: ICD-10-CM

## 2024-03-13 DIAGNOSIS — R79.89 ELEVATED PARATHYROID HORMONE: Primary | ICD-10-CM

## 2024-03-13 DIAGNOSIS — E78.2 MIXED HYPERLIPIDEMIA: ICD-10-CM

## 2024-03-13 RX ORDER — METFORMIN HYDROCHLORIDE 500 MG/1
500 TABLET, EXTENDED RELEASE ORAL
Qty: 90 TABLET | Refills: 3 | Status: SHIPPED | OUTPATIENT
Start: 2024-03-13 | End: 2025-03-13

## 2024-03-13 RX ORDER — ROSUVASTATIN CALCIUM 10 MG/1
10 TABLET, COATED ORAL DAILY
Qty: 90 TABLET | Refills: 3 | Status: SHIPPED | OUTPATIENT
Start: 2024-03-13 | End: 2025-03-13

## 2024-03-13 NOTE — ASSESSMENT & PLAN NOTE
24 hour urine calcium from 3/11/24 elevated at 26 (ULN 12)  + history of kidney stones with elevated PTH (117.5)  --but she is normocalcemic with normal eGFR  --need to get DEXA, but currently no definitive indication for surgery or further evaluation of what is probably primary HPT  --also, vitamin D will get checked with next set of labs (to exclude vitamin D def as a cause of secondary HPT)

## 2024-05-15 ENCOUNTER — TELEPHONE (OUTPATIENT)
Dept: PRIMARY CARE CLINIC | Facility: CLINIC | Age: 64
End: 2024-05-15
Payer: COMMERCIAL

## 2024-05-15 DIAGNOSIS — E11.9 TYPE 2 DIABETES MELLITUS WITHOUT COMPLICATION, WITHOUT LONG-TERM CURRENT USE OF INSULIN: Primary | ICD-10-CM

## 2024-05-15 RX ORDER — INSULIN PUMP SYRINGE, 3 ML
EACH MISCELLANEOUS
Qty: 1 EACH | Refills: 0 | Status: SHIPPED | OUTPATIENT
Start: 2024-05-15 | End: 2025-05-15

## 2024-05-15 NOTE — TELEPHONE ENCOUNTER
Pt came by office - states that glucometer is broken.      Would like rx sent to [facility] for new one    States old one is about 5 years old.

## 2024-05-20 ENCOUNTER — TELEPHONE (OUTPATIENT)
Dept: INTERNAL MEDICINE | Facility: CLINIC | Age: 64
End: 2024-05-20
Payer: COMMERCIAL

## 2024-05-20 NOTE — TELEPHONE ENCOUNTER
Attempted to return patient's call.  No answer.  Left voicemail with call back number [phone].

## 2024-05-24 DIAGNOSIS — E11.9 TYPE 2 DIABETES MELLITUS WITHOUT COMPLICATION, WITHOUT LONG-TERM CURRENT USE OF INSULIN: ICD-10-CM

## 2024-05-24 DIAGNOSIS — E78.2 MIXED HYPERLIPIDEMIA: Primary | ICD-10-CM

## 2024-06-03 ENCOUNTER — NUTRITION (OUTPATIENT)
Dept: INTERNAL MEDICINE | Facility: CLINIC | Age: 64
End: 2024-06-03
Payer: COMMERCIAL

## 2024-06-03 DIAGNOSIS — E11.9 TYPE 2 DIABETES MELLITUS WITHOUT COMPLICATION, WITHOUT LONG-TERM CURRENT USE OF INSULIN: ICD-10-CM

## 2024-06-03 PROCEDURE — 97802 MEDICAL NUTRITION INDIV IN: CPT | Mod: S$GLB,,, | Performed by: DIETITIAN, REGISTERED

## 2024-06-03 NOTE — PROGRESS NOTES
"Nutrition Assessment  Session Time:  60 minutes      Client name:  Lexis Allen  :  1960  Age:  64 y.o.  Gender:  female    Client states:  referred by Kavin Marti MD with a diagnosis of:   -   Type 2 diabetes mellitus without complication, without long-term current use of insulin         Reports starting Metformin 30 days ago plus also taking a statin.  Reports possibly beginning Ozempic in the future, but patient is not sure she wants to due to side effects she ready about online.  Reports no changes to appetite or cravings since starting Metformin. States weight has actually increased.     Randomly checks glucose levels, no set schedule    Patient with goal to weigh 195 lbs.    Successful with losing 20 lbs in the past by following a keto style diet (little carbohydrates, plenty proteins + salads/vegetables). Was not able to maintain long term.      Exercise: 1x/week may go on the treadmill for a few minutes// sedentary work + lifestyle    Sample intake now:  Breakfast: coffee with little cream  2pm, late small lunch: leftovers// salad + half chicken breast  Dinner: meat (hamburger colton, baked chicken breast, fish) + vegetables/salad  Drinks: diet Dr. Pepper, zero calorie iced tea ,water (increased lately)  Gave up bread, pasta, rice     Reports tending to have cravings for sweets at night time. Will fix a bowl of fruit + whipped topping and that will satisfy her craving.    Reports attending diabetes education classes in the past at [facility]    Questions regarding:  -acceptable glucose ranges  -if she can continue intermittent fasting    Anthropometrics  Height:  61"     Weight:  217.3 lbs   BMI:  41.1  % Body Fat:  n/a     RECENT WEIGHTS      Weight (lb) Weight (kg) BMI (Calculated)   2022 201.06  91.2  38    2022 199  90.266  37.6    2022 199  90.266  37.6    2022 203.4  92.262  38.5    3/7/2024 214.84  97.45           Clinical Signs/Symptoms  N/V/D:  none " noted  Appetite:  good       Past Medical History:   Diagnosis Date    Diabetes mellitus, type 2     Hyperlipidemia     Kidney stone     Pneumonia, unspecified organism        Past Surgical History:   Procedure Laterality Date     SECTION      LITHOTRIPSY         Medications    has a current medication list which includes the following prescription(s): blood sugar diagnostic, blood-glucose meter, metformin, and rosuvastatin.    Vitamins, Minerals, and/or Supplements:  not discussed     Food/Medication Interactions:  Reviewed     Food Allergies or Intolerances:  NKFA noted in chart     Social History    Marital status:    Employment:  yes    Social History     Tobacco Use    Smoking status: Former     Current packs/day: 0.00     Types: Cigarettes     Quit date:      Years since quittin.4    Smokeless tobacco: Never   Substance Use Topics    Alcohol use: Never        Lab Reports   Sodium   Date Value Ref Range Status   2023 142 136 - 145 mmol/L Final     Potassium   Date Value Ref Range Status   2023 5.0 3.5 - 5.1 mmol/L Final     Chloride   Date Value Ref Range Status   2023 108 95 - 110 mmol/L Final     CO2   Date Value Ref Range Status   2023 23 23 - 29 mmol/L Final     Glucose   Date Value Ref Range Status   2023 159 (H) 70 - 110 mg/dL Final     BUN   Date Value Ref Range Status   2023 14 8 - 23 mg/dL Final     Creatinine   Date Value Ref Range Status   2023 0.7 0.5 - 1.4 mg/dL Final     Calcium   Date Value Ref Range Status   2023 9.6 8.7 - 10.5 mg/dL Final     AST   Date Value Ref Range Status   2011 29 10 - 40 U/L Final     ALT   Date Value Ref Range Status   2011 35 10 - 44 U/L Final     Alanine Aminotransferase   Date Value Ref Range Status   2021 17 10 - 60 IU/L Final     Anion Gap   Date Value Ref Range Status   2023 11 8 - 16 mmol/L Final     eGFR    Date Value Ref Range Status   2021 116   Final      Lab Results   Component Value Date    WBC 6.99 04/22/2004    HGB 12.4 04/22/2004    HCT 38.3 04/22/2004    MCV 83.3 04/22/2004     (H) 04/22/2004        Lab Results   Component Value Date    CHOL 246 (H) 12/22/2023     Lab Results   Component Value Date    HDL 45 12/22/2023     Lab Results   Component Value Date    LDLCALC 154.6 12/22/2023     Lab Results   Component Value Date    TRIG 232 (H) 12/22/2023     Lab Results   Component Value Date    CHOLHDL 18.3 (L) 12/22/2023     Lab Results   Component Value Date    HGBA1C 7.4 (H) 12/22/2023     BP Readings from Last 1 Encounters:   03/07/24 138/86       Food History  Provided above    Exercise History:  provided above    Cultural/Spiritual/Personal Preferences:  None identified    Support System:  family/friends    State of Change:  Contemplation    Barriers to Change:  none    Diagnosis    Altered nutrition related laboratory values related to inadequate/excess intake of carbohydrates, improper pairing of food groups, physical inactivity as evidenced by HgbA1c 7.4.  Obesity related to excess energy intake as evidenced by BMI 41    Intervention    RMR (Method:  [place] Jeor):  1479 kcal  Activity Factor:  1.2    RUTHANN:  1774 - 250 = 1524 kcal    Goals:  1.  Increase exercise frequency/duration/intensity. Also consider short walks after meals as able.  2.  Aim for 30-45 grams of carbohydrates per meal  3.  Aim to consume protein + high fiber carbohydrate with each meal  4. Schedule appointments with diabetes educator    Nutrition Education  The following education was provided to the patient:  Suggested dietary modifications based on current dietary behaviors and individual food preferences.  Discussed physical activity guidelines and its associated benefits.  Discussed nutrition therapy for diabetes  Discussed acceptable glucose ranges    Patient verbalized understanding of nutrition education and recommendations received.    Handouts  Provided  Eat Fit Shopping List  Balanced Snacking  Balanced Diabetes Plate    Monitoring/Evaluation    Monitor the following:  Weight  BMI  Caloric intake  Labs:  HgbA1c    Follow Up Plan:  as lazara

## 2024-06-04 ENCOUNTER — APPOINTMENT (OUTPATIENT)
Dept: RADIOLOGY | Facility: HOSPITAL | Age: 64
End: 2024-06-04
Attending: INTERNAL MEDICINE
Payer: COMMERCIAL

## 2024-06-04 DIAGNOSIS — R79.89 ELEVATED PARATHYROID HORMONE: ICD-10-CM

## 2024-06-04 PROCEDURE — 77080 DXA BONE DENSITY AXIAL: CPT | Mod: TC

## 2024-06-04 PROCEDURE — 77080 DXA BONE DENSITY AXIAL: CPT | Mod: 26,,, | Performed by: RADIOLOGY

## 2024-06-05 ENCOUNTER — TELEPHONE (OUTPATIENT)
Dept: DIABETES | Facility: CLINIC | Age: 64
End: 2024-06-05
Payer: COMMERCIAL

## 2024-06-06 ENCOUNTER — CLINICAL SUPPORT (OUTPATIENT)
Dept: DIABETES | Facility: CLINIC | Age: 64
End: 2024-06-06
Payer: COMMERCIAL

## 2024-06-06 VITALS — WEIGHT: 220.44 LBS | BODY MASS INDEX: 41.66 KG/M2

## 2024-06-06 DIAGNOSIS — E11.9 TYPE 2 DIABETES MELLITUS WITHOUT COMPLICATION, WITHOUT LONG-TERM CURRENT USE OF INSULIN: ICD-10-CM

## 2024-06-06 PROCEDURE — G0108 DIAB MANAGE TRN  PER INDIV: HCPCS | Mod: S$GLB,,, | Performed by: PEDIATRICS

## 2024-06-06 PROCEDURE — 99999 PR PBB SHADOW E&M-EST. PATIENT-LVL III: CPT | Mod: PBBFAC,,,

## 2024-06-06 NOTE — PROGRESS NOTES
Diabetes Care Specialist Progress Note  Author: Clemencia Duncan RN  Date: 6/6/2024    Program Intake  Reason for Diabetes Program Visit:: Initial Diabetes Assessment  Current diabetes risk level:: moderate  In the last 12 months, have you:: none  Permission to speak with others about care:: no  Continuous Glucose Monitoring  Patient has CGM: No    Lab Results   Component Value Date    HGBA1C 7.4 (H) 12/22/2023     CURRENT MEDS  Metformin  mg daily     Clinical    Weight: 100 kg (220 lb 7.4 oz)       Body mass index is 41.66 kg/m².    Patient Health Rating  Compared to other people your age, how would you rate your health?: Fair    Problem Review  Reviewed Problem List with Patient: yes  Active comorbidities affecting diabetes self-care.: yes  Comorbidities: Cardiovascular Disease  Reviewed health maintenance: yes    Clinical Assessment  Current Diabetes Treatment: Oral Medication  Have you ever experienced hypoglycemia (low blood sugar)?: no  Have you ever experienced hyperglycemia (high blood sugar)?: yes  In the last month, how often have you experienced high blood sugar?: once a week  Are you able to tell when your blood sugar is high?: Yes  What are your symptoms?: fatigue  Have you ever been hospitalized because your blood sugar was high?: no    Medication Information  How do you obtain your medications?: Patient drives  How many days a week do you miss your medications?: Never  Do you sometimes have difficulty refilling your medications?: No  Medication adherence impacting ability to self-manage diabetes?: No    Labs  Do you have regular lab work to monitor your medications?: Yes  Type of Regular Lab Work: A1c, Cholesterol, Microalbumin, CBC, BMP  Where do you get your labs drawn?: [facility]  Lab Compliance Barriers: No    Nutritional Status  Diet: Regular  Meal Plan 24 Hour Recall: Breakfast, Dinner, Lunch, Snack  Meal Plan 24 Hour Recall - Breakfast: None; usually skips.  Meal Plan 24 Hour Recall -  Lunch: BBQ beef & salad; Water  Meal Plan 24 Hour Recall - Dinner: Baked fish & veggies; Water  Meal Plan 24 Hour Recall - Snack: None.  Change in appetite?: Yes (Increased.)  Dentation:: Intact  Recent Changes in Weight: Weight Gain (Over the last year)  Was weight loss intentional or unintentional?: Unintentional  Current nutritional status an area of need that is impacting patient's ability to self-manage diabetes?: No    Additional Social History    Support  Does anyone support you with your diabetes care?: yes  Who supports you?: self  Who takes you to your medical appointments?: self  Does the current support meet the patient's needs?: Yes  Is Support an area impacting ability to self-manage diabetes?: No    Access to Mass Media & Technology  Does the patient have access to any of the following devices or technologies?: Smart phone  Media or technology needs impacting ability to self-manage diabetes?: No    Cognitive/Behavioral Health  Alert and Oriented: Yes  Difficulty Thinking: No  Requires Prompting: No  Requires assistance for routine expression?: No  Cognitive or behavioral barriers impacting ability to self-manage diabetes?: No    Culture/Mandaeism  Culture or Restorationist beliefs that may impact ability to access healthcare: No    Communication  Language preference: English  Hearing Problems: No  Vision Problems: Yes  Vision problem type:: Decreased Vision  Vision Assistance: Glasses  Communication needs impacting ability to self-manage diabetes?: No    Health Literacy  Preferred Learning Method: Face to Face, Reading Materials  How often do you need to have someone help you read instructions, pamphlets, or written material from your doctor or pharmacy?: Never  Health literacy needs impacting ability to self-manage diabetes?: No      Diabetes Self-Management Skills Assessment    Diabetes Disease Process/Treatment Options  Patient/caregiver able to state what happens when someone has diabetes.:  somewhat  Patient/caregiver knows what type of diabetes they have.: yes  Diabetes Type : Type II  Patient/caregiver able to identify at least three signs and symptoms of diabetes.: no (ID'd fatigue)  Patient able to identify at least three risk factors for diabetes.: yes  Identified risk factors:: being overweight, family history, history of gestational diabetes  Diabetes Disease Process/Treatment Options: Skills Assessment Completed: Yes  Assessment indicates:: Knowledge deficit  Area of need?: Yes    Nutrition/Healthy Eating  Challenges to healthy eating:: snacking between meals and at night, eating when feeling depressed/stressed  Method of carbohydrate measurement:: carb counting/reading labels  Patient can identify foods that impact blood sugar.: yes  Patient-identified foods:: starches (bread, pasta, rice, cereal), starchy vegetables (corn, peas, beans), fruit/fruit juice, sweets  Nutrition/Healthy Eating Skills Assessment Completed:: Yes  Assessment indicates:: Knowledge deficit, Instruction Needed  Area of need?: Yes    Physical Activity/Exercise  Patient's daily activity level:: lightly active  Patient formally exercises outside of work.: yes  Exercise Type: walking  Intensity: Low  Frequency: twice a week  Duration: 15 min  Patient can identify forms of physical activity.: yes  Stated forms of physical activity:: any movement performed by muscles that uses energy  Patient can identify reasons why exercise/physical activity is important in diabetes management.: yes  Identified reasons:: lowers blood glucose, blood pressure, and cholesterol, strengthens heart, muscles, and bones  Physical Activity/Exercise Skills Assessment Completed: : Yes  Assessment indicates:: Knowledge deficit, Instruction Needed  Area of need?: Yes    Medications  Patient is able to describe current diabetes management routine.: yes  Diabetes management routine:: oral medications  Patient is able to identify current diabetes  medications, dosages, and appropriate timing of medications.: yes  Patient understands the purpose of the medications taken for diabetes.: no  Patient reports problems or concerns with current medication regimen.: no  Medication Skills Assessment Completed:: Yes  Assessment indicates:: Knowledge deficit  Area of need?: Yes    Home Blood Glucose Monitoring  Patient states that blood sugar is checked at home daily.: yes  Monitoring Method:: home glucometer  How often do you check your blood sugar?: Occasionally  When you check what is your typical blood sugar range? : 165, 170, 200, 140  Blood glucose logs:: no  Blood glucose logs reviewed today?: no  Home Blood Glucose Monitoring Skills Assessment Completed: : Yes  Assessment indicates:: Knowledge deficit  Area of need?: Yes    Acute Complications  Acute Complications Skills Assessment Completed: : No  Deffered due to:: Time  Area of need?: Deferred    Chronic Complications  Patient can identify major chronic complications of diabetes.: yes  Stated chronic complications:: heart disease/heart attack, retinopathy, kidney disease  Patient can identify ways to prevent or delay diabetes complications.: yes  Stated ways to prevent complications:: avoiding smoking and other types of tobacco, controlling blood sugar, controlling cholesterol and triglycerides, having regular diabetic eye exams, healthy eating and regular activity, maintaining optimal blood glucose control  Patient is aware that having diabetes increases risk of heart disease?: Yes  Patient is aware that heart disease is the leading cause of death and disability in people with diabetes?: Yes  Patient able to state risk factors for heart disease?: No  Patient is taking statin?: Yes  Chronic Complications Skills Assessment Completed: : Yes  Assessment indicates:: Adequate understanding  Area of need?: No    Psychosocial/Coping  Patient can identify ways of coping with chronic disease.: no (see  comments)  Psychosocial/Coping Skills Assessment Completed: : Yes  Assessment indicates:: Knowledge deficit, Instruction Needed  Area of need?: Yes      Assessment Summary and Plan    Based on today's diabetes care assessment, the following areas of need were identified:          6/6/2024    12:01 AM   Social   Support No   Access to Mass Media/Tech No   Cognitive/Behavioral Health No   Culture/Pentecostal No   Communication No   Health Literacy No            6/6/2024    12:01 AM   Clinical   Medication Adherence No   Lab Compliance No   Nutritional Status No            6/6/2024    12:01 AM   Diabetes Self-Management Skills   Diabetes Disease Process/Treatment Options Yes-Educated on the patho of Type 2 diabetes including risk factors and treatment options.     Nutrition/Healthy Eating Yes-See care plan.     Physical Activity/Exercise Yes-See care plan.     Medication Yes-Discussed MOA, onset, side effects, dosage of Metformin. Also discussed use of Ozempic, which her PCP had discussed with her previously.     Home Blood Glucose Monitoring Yes-Checking blood sugar occasionally. Educated to check 1x/day in the AM with a goal of  mg/dL.      Acute Complications Deferred.     Chronic Complications No.      Psychosocial/Coping Yes-Encouraged her to talk to her family members about her diabetes diagnosis, so they can help support her and encourage her.            Today's interventions were provided through individual discussion, instruction, and written materials were provided.      Patient verbalized understanding of instruction and written materials.  Pt was able to return back demonstration of instructions today. Patient understood key points, needs reinforcement and further instruction.     Diabetes Self-Management Care Plan:    Today's Diabetes Self-Management Care Plan was developed with Lexis's input. Lexis has agreed to work toward the following goal(s) to improve his/her overall diabetes control.      Care  Plan: Diabetes Management   Updates made since 5/7/2024 12:00 AM        Problem: Healthy Eating         Goal: Eat 2 meals daily with 30-45g of carbohydrates per meal and 0-15g per snack.    Start Date: 6/6/2024   Expected End Date: 3/7/2025   Priority: High   Barriers: No Barriers Identified   Note:    Has received DM education in the past. Knows about healthy eating and what to do, but needs reinforcement and refresher.   Educated on the importance of balanced meals and snacks.   Educated on the importance of consistent meal times.        Task: Reviewed the sources and role of Carbohydrate, Protein, and Fat and how each nutrient impacts blood sugar. Completed 6/6/2024        Task: Provided visual examples using dry measuring cups, food models, and other familiar objects such as computer mouse, deck or cards, tennis ball etc. to help with visualization of portions. Completed 6/6/2024        Task: Explained how to count carbohydrates using the food label and the use of dry measuring cups for accurate carb counting. Completed 6/6/2024        Task: Review the importance of balancing carbohydrates with each meal using portion control techniques to count servings of carbohydrate and label reading to identify serving size and amount of total carbs per serving. Completed 6/6/2024        Task: Provided Sample plate method and reviewed the use of the plate to estimate amounts of carbohydrate per meal. Completed 6/6/2024        Problem: Physical Activity and Exercise         Goal: Patient agrees to increase physical activity to a goal of 5 times per week for 30 minutes.    Start Date: 6/6/2024   Expected End Date: 3/7/2025   Priority: Medium   Barriers: No Barriers Identified   Note:    Currently walking on treadmill 2-3x/week for 10-15 minutes.       Task: Discussed role of physical activity on reducing insulin resistance and improvement in overall glycemic control. Completed 6/6/2024        Task: Discussed role of physical  activity as it relates to weight loss Completed 6/6/2024        Task: Offered suggestions on how patient could increase their regular physical activity Completed 6/6/2024          Follow Up Plan     Follow up in about 1 month (around 7/6/2024) for review of goals.    Today's care plan and follow up schedule was discussed with patient.  Lexis verbalized understanding of the care plan, goals, and agrees to follow up plan.        The patient was encouraged to communicate with his/her health care provider/physician and care team regarding his/her condition(s) and treatment.  I provided the patient with my contact information today and encouraged to contact me via phone or [facility] Patient Portal as needed.     Length of Visit   Total Time: 60 Minutes

## 2024-06-10 PROBLEM — Z00.00 HEALTHCARE MAINTENANCE: Status: RESOLVED | Noted: 2022-12-16 | Resolved: 2024-06-10

## 2024-06-13 ENCOUNTER — TELEPHONE (OUTPATIENT)
Dept: DIABETES | Facility: CLINIC | Age: 64
End: 2024-06-13
Payer: COMMERCIAL

## 2024-06-13 NOTE — TELEPHONE ENCOUNTER
Returned patient's call. She had questions about inconsistencies in her blood sugar monitor results- difference of 51 mg/dl in a matter of one minute. Explained that some variance is normal but she could use a control solution to ensure accuracy of her meter/strips. She plans to compare readings between 2 different meters. Patient will call meter  customer support if she continues to have problems.     ----- Message from Jessenia Ashley sent at 6/13/2024 12:12 PM CDT -----  Contact: self  Patient requesting a call back regarding a follow up from her recent visit. Patient would like to speak with Sabrina. Please call back @ [phone]

## 2024-07-31 ENCOUNTER — LAB VISIT (OUTPATIENT)
Dept: LAB | Facility: HOSPITAL | Age: 64
End: 2024-07-31
Attending: INTERNAL MEDICINE
Payer: COMMERCIAL

## 2024-07-31 DIAGNOSIS — E11.9 TYPE 2 DIABETES MELLITUS WITHOUT COMPLICATION, WITHOUT LONG-TERM CURRENT USE OF INSULIN: ICD-10-CM

## 2024-07-31 DIAGNOSIS — E78.2 MIXED HYPERLIPIDEMIA: ICD-10-CM

## 2024-07-31 DIAGNOSIS — R79.89 ELEVATED PARATHYROID HORMONE: ICD-10-CM

## 2024-07-31 LAB
CHOLEST SERPL-MCNC: 155 MG/DL (ref 120–199)
CHOLEST/HDLC SERPL: 4.3 {RATIO} (ref 2–5)
ESTIMATED AVG GLUCOSE: 200 MG/DL (ref 68–131)
HBA1C MFR BLD: 8.6 % (ref 4–5.6)
HDLC SERPL-MCNC: 36 MG/DL (ref 40–75)
HDLC SERPL: 23.2 % (ref 20–50)
LDLC SERPL CALC-MCNC: 65.4 MG/DL (ref 63–159)
NONHDLC SERPL-MCNC: 119 MG/DL
TRIGL SERPL-MCNC: 268 MG/DL (ref 30–150)

## 2024-07-31 PROCEDURE — 83036 HEMOGLOBIN GLYCOSYLATED A1C: CPT | Performed by: INTERNAL MEDICINE

## 2024-07-31 PROCEDURE — 82306 VITAMIN D 25 HYDROXY: CPT | Performed by: INTERNAL MEDICINE

## 2024-07-31 PROCEDURE — 36415 COLL VENOUS BLD VENIPUNCTURE: CPT | Performed by: INTERNAL MEDICINE

## 2024-07-31 PROCEDURE — 80061 LIPID PANEL: CPT | Performed by: INTERNAL MEDICINE

## 2024-08-01 DIAGNOSIS — R03.0 ELEVATED BLOOD PRESSURE READING: ICD-10-CM

## 2024-08-01 DIAGNOSIS — E11.9 TYPE 2 DIABETES MELLITUS WITHOUT COMPLICATION, WITHOUT LONG-TERM CURRENT USE OF INSULIN: Primary | ICD-10-CM

## 2024-08-01 DIAGNOSIS — E55.9 VITAMIN D DEFICIENCY: ICD-10-CM

## 2024-08-01 DIAGNOSIS — E78.2 MIXED HYPERLIPIDEMIA: ICD-10-CM

## 2024-08-01 LAB — 25(OH)D3+25(OH)D2 SERPL-MCNC: 10 NG/ML (ref 30–96)

## 2024-08-01 RX ORDER — ERGOCALCIFEROL 1.25 MG/1
50000 CAPSULE ORAL
Qty: 8 CAPSULE | Refills: 0 | Status: SHIPPED | OUTPATIENT
Start: 2024-08-01

## 2024-08-01 RX ORDER — SEMAGLUTIDE 0.68 MG/ML
0.5 INJECTION, SOLUTION SUBCUTANEOUS
Qty: 3 ML | Refills: 3 | Status: SHIPPED | OUTPATIENT
Start: 2024-08-01

## 2024-08-01 NOTE — ASSESSMENT & PLAN NOTE
Lab Results   Component Value Date    HGBA1C 8.6 (H) 07/31/2024     --she is tolerating metformin, but still only at the 500 mg dose  --her A1c has jumped considerably  **Begin Ozempic 0.25 mg weekly  **Continue to titrate Metformin ER up to 1000 mg daily  **recheck A1c in 9/2024 or 10/2024

## 2024-08-01 NOTE — ASSESSMENT & PLAN NOTE
Lab Results   Component Value Date    LDLCALC 65.4 07/31/2024   --LDL at goal on Crestor 10 mg daily    The 10-year ASCVD risk score (Lory SANTA, et al., 2019) is: 9.1%    Values used to calculate the score:      Age: 64 years      Sex: Female      Is Non- : No      Diabetic: Yes      Tobacco smoker: No      Systolic Blood Pressure: 122 mmHg      Is BP treated: No      HDL Cholesterol: 36 mg/dL      Total Cholesterol: 155 mg/dL

## 2024-09-10 ENCOUNTER — E-CONSULT (OUTPATIENT)
Dept: PHARMACY | Facility: CLINIC | Age: 64
End: 2024-09-10
Payer: COMMERCIAL

## 2024-09-10 DIAGNOSIS — E11.9 TYPE 2 DIABETES MELLITUS WITHOUT COMPLICATION, WITHOUT LONG-TERM CURRENT USE OF INSULIN: Primary | ICD-10-CM

## 2024-09-10 DIAGNOSIS — E66.01 CLASS 2 SEVERE OBESITY DUE TO EXCESS CALORIES WITH SERIOUS COMORBIDITY AND BODY MASS INDEX (BMI) OF 38.0 TO 38.9 IN ADULT: ICD-10-CM

## 2024-09-10 DIAGNOSIS — E11.9 TYPE 2 DIABETES MELLITUS WITHOUT COMPLICATION, WITHOUT LONG-TERM CURRENT USE OF INSULIN: ICD-10-CM

## 2024-09-10 RX ORDER — SEMAGLUTIDE 0.68 MG/ML
0.5 INJECTION, SOLUTION SUBCUTANEOUS
Qty: 3 ML | Refills: 3 | Status: SHIPPED | OUTPATIENT
Start: 2024-09-10

## 2024-09-10 RX ORDER — METFORMIN HYDROCHLORIDE 500 MG/1
1000 TABLET, EXTENDED RELEASE ORAL
Qty: 180 TABLET | Refills: 3 | Status: SHIPPED | OUTPATIENT
Start: 2024-09-10 | End: 2025-09-10

## 2024-09-10 NOTE — CONSULTS
[facility]  Response for E-Consult     Patient Name: Lexis Allen  MRN: [id number]  Primary Care Provider: Kavin Marti MD   Requesting Provider: Kavin Marti MD  E-Consult to Pharmacy  Consult performed by: Yodit Stoner, [place]  Consult ordered by: Kavin Marti MD  Reason for consult: Cost savings/formulary question          Unfortunately, this eConsult has been declined due to: Other    Other:  This eConsult submission cannot be completed at this time due to PA was Closed - Other  because it is not required for the medication. Hyperlink provided for extra information.      Thank you for this eConsult referral.     Yodit Stoner, Carey  [facility]

## 2024-09-17 PROBLEM — M85.80 LOW BONE MASS: Status: ACTIVE | Noted: 2024-09-17

## 2024-09-17 NOTE — ASSESSMENT & PLAN NOTE
DEXA 6/4/24:  FINDINGS:  The L1 to L4 vertebral bone mineral density is equal to 1.14 g/cm squared with a T score of -0.4.     The left femoral neck bone mineral density is equal to 0.79 g/cm squared with a T score of -1.8.     There is a 8.6% risk of a major osteoporotic fracture and a 1.0% risk of hip fracture in the next 10 years (FRAX).     Impression:  Osteopenia     Consider FDA approved medical therapies in postmenopausal women and men aged 50 years and older, based on the following:     *A hip or vertebral (clinical or morphometric) fracture  *T score less than or equal to -2.5 at the femoral neck or spine after appropriate evaluation to exclude secondary causes.  *Low bone mass -- also known as osteopenia (T score between -1.0 and -2.5 at the femoral neck or spine) and a 10 year probability of hip fracture greater than or equal to 3% or a 10 year probability of major osteoporosis-related fracture greater than or equal to 20% based on the [place]-adapted WHO algorithm.

## 2024-09-17 NOTE — PROGRESS NOTES
[facility] Provider Appointment  Kavin VIEYRA)    Subjective:     History of Present Illness    CHIEF COMPLAINT:  Lexis presents today for follow up.    DIABETES MANAGEMENT:  She reports morning blood sugar readings around 190-200 every 2-3 days, with other days around 170. She experiences extreme fatigue, often falling asleep at 7 PM. She is currently taking 1000mg metformin daily (2 pills) and is starting Ozempic (weekly injection) for diabetes management. She denies any current side effects from metformin. She reports a family history of diabetes, with one sister and two brothers affected. She expresses concern about the safety of Ozempic but acknowledges its potential benefits for weight loss and diabetes management. She mentions her brother's experience with Ozempic, noting an eye-related issue he believed might be associated with the medication. She seeks reassurance about the safety and long-term use of Ozempic.    CHOLESTEROL MANAGEMENT:  She reports adherence to Crestor for cholesterol management. LDL level is 65, which is within the desired range. She expresses satisfaction with the improvement in cholesterol levels and reports no side effects or concerns related to Crestor.    VITAMIN D DEFICIENCY AND BONE HEALTH:  She reports forgetting to take the prescribed weekly vitamin D supplement regularly, recalling taking only one dose. She acknowledges having the medication at home and expresses intention to resume taking it as prescribed. A DEXA scan revealed a history of low bone mass, not osteoporosis.    HYPERPARATHYROIDISM AND KIDNEY STONES:  She has a history of kidney stones but is uncertain if they were calcium-containing. She recalls passing a stone at home after drinking large amounts of water as advised by her doctor. The stone was collected and given to Dr. St for analysis, but she does not remember the results. She expresses willingness to investigate her medical records to determine if the  stone composition was documented.    EXERCISE:  She reports walking to the mailbox daily, which is approximately 1.5 blocks away from her home. She has a treadmill in her bedroom, provided by her son, for additional exercise opportunities.      ROS:  General: no fever, no chills, reports fatigue, no weight gain, no weight loss  Eyes: no vision changes, no redness, no discharge  ENT: no ear pain, no nasal congestion, no sore throat  Cardiovascular: no chest pain, no palpitations, no lower extremity edema  Respiratory: no cough, no shortness of breath  Gastrointestinal: no abdominal pain, no nausea, no vomiting, no diarrhea, no constipation, no blood in stool  Genitourinary: no dysuria, no hematuria, no frequency  Musculoskeletal: no joint pain, no muscle pain  Skin: no rash, no lesion  Neurological: no headache, no dizziness, no numbness, no tingling  Psychiatric: no anxiety, no depression, no sleep difficulty, reports memory problems               2022   PHQ-9 Depression Patient Health Questionnaire   Over the last two weeks how often have you been bothered by little interest or pleasure in doing things 0   Over the last two weeks how often have you been bothered by feeling down, depressed or hopeless 0          No data to display                Social History     Socioeconomic History    Marital status:    Occupational History    Occupation: writer/   Tobacco Use    Smoking status: Former     Current packs/day: 0.00     Types: Cigarettes     Quit date:      Years since quittin.7    Smokeless tobacco: Never   Substance and Sexual Activity    Alcohol use: Never    Drug use: Never    Sexual activity: Not Currently     Birth control/protection: None         Objective:     Vital Signs  /78   Pulse 96   Temp 96.9 °F (36.1 °C)   Wt 97.3 kg (214 lb 6.4 oz)   SpO2 98%   BMI 40.51 kg/m²     Physical Exam    General: Well-developed. Well-nourished. No acute distress.  Eyes: EOMI.  Sclerae anicteric.  HENT: Normocephalic. Atraumatic. Nares patent. Moist oral mucosa.  Cardiovascular: Regular rate. Regular rhythm. No murmurs. No rubs. No gallops. Normal S1, S2. Good pulses. Good circulation.  Respiratory: Normal respiratory effort. Clear to auscultation bilaterally. No rales. No rhonchi. No wheezing.  Musculoskeletal: No  obvious deformity.  Extremities: No lower extremity edema.  Neurological: Alert & oriented x3. No slurred speech. Normal gait.  Psychiatric: Normal mood. Normal affect. Good insight. Good judgment.  Skin: Warm. Dry. No rash.           Assessment:   64 y.o. female here for primary care visit       Plan:   1. Type 2 diabetes mellitus without complication, without long-term current use of insulin  Overview:  Metformin ER 1000 mg daily    No albuminuria as of 12/22/23  -due for foot exam (no neuropathy) as of 3/7/24  -due for eye exam (no retinopathy) -- needs diabetic eye photo or optometry eval -- ordered 3/7/24    Assessment & Plan:  Lab Results   Component Value Date    HGBA1C 8.6 (H) 07/31/2024     --unfortunately, her Ozempic required a prior auth, which didn't happen until this week  --she was checking her sugars with AM readings as high as 170 to 200  **so, she is just now starting Ozempic 0.25 mg weekly  **hold off on rechecking A1c until after she has been on the Ozempic for a few weeks    Orders:  -     HEMOGLOBIN A1C; Future; Expected date: 12/19/2024  -     COMPREHENSIVE METABOLIC PANEL; Future; Expected date: 12/19/2024    2. Low bone mass  Assessment & Plan:  DEXA 6/4/24:  FINDINGS:  The L1 to L4 vertebral bone mineral density is equal to 1.14 g/cm squared with a T score of -0.4.     The left femoral neck bone mineral density is equal to 0.79 g/cm squared with a T score of -1.8.     There is a 8.6% risk of a major osteoporotic fracture and a 1.0% risk of hip fracture in the next 10 years (FRAX).     Impression:  Osteopenia     Consider FDA approved medical therapies  in postmenopausal women and men aged 50 years and older, based on the following:     *A hip or vertebral (clinical or morphometric) fracture  *T score less than or equal to -2.5 at the femoral neck or spine after appropriate evaluation to exclude secondary causes.  *Low bone mass -- also known as osteopenia (T score between -1.0 and -2.5 at the femoral neck or spine) and a 10 year probability of hip fracture greater than or equal to 3% or a 10 year probability of major osteoporosis-related fracture greater than or equal to 20% based on the [place]-adapted WHO algorithm.      3. Elevated parathyroid hormone  Overview:  24 hour urine calcium from 3/11/24 elevated at 26 (ULN 12)  + history of kidney stones with elevated PTH (117.5)  --but she is normocalcemic with normal eGFR   6/4/24 DEXA:  low bone mass  Vitamin D from 7/2024: 10    Assessment & Plan:  Lab Results   Component Value Date    .5 (H) 12/22/2023    CALCIUM 9.6 12/22/2023   **will follow clinically >> might be primary HPT, but only current indication for surgery would be history of kidney stones (not certain if Ca stone or not though); however she could also have secondary HPT from vitamin D def (her last level was 10)  --she took one ergocalciferol and then forgot to take the rest  **resume ergocalciferol and take weekly until course is complete  **following repletion will recheck Vit D and PTH        Orders:  -     PTH, intact; Future; Expected date: 12/19/2024  -     Misc Sendout Test, Blood 25-OH vitamin D level; Future; Expected date: 12/19/2024    4. Class 2 severe obesity due to excess calories with serious comorbidity and body mass index (BMI) of 38.0 to 38.9 in adult  Overview:  Baseline weight 201 lbs as of 12/2021  Weight 203 lbs as of 12/2022  Weight 214 lbs as of 3/7/24    Assessment & Plan:  **starting Ozempic this week (~9/17/24)    CMP  Sodium   Date Value Ref Range Status   12/22/2023 142 136 - 145 mmol/L Final     Potassium   Date Value  Ref Range Status   12/22/2023 5.0 3.5 - 5.1 mmol/L Final     Chloride   Date Value Ref Range Status   12/22/2023 108 95 - 110 mmol/L Final     CO2   Date Value Ref Range Status   12/22/2023 23 23 - 29 mmol/L Final     Glucose   Date Value Ref Range Status   12/22/2023 159 (H) 70 - 110 mg/dL Final     BUN   Date Value Ref Range Status   12/22/2023 14 8 - 23 mg/dL Final     Creatinine   Date Value Ref Range Status   12/22/2023 0.7 0.5 - 1.4 mg/dL Final     Calcium   Date Value Ref Range Status   12/22/2023 9.6 8.7 - 10.5 mg/dL Final     AST   Date Value Ref Range Status   08/26/2011 29 10 - 40 U/L Final     ALT   Date Value Ref Range Status   08/26/2011 35 10 - 44 U/L Final     Alanine Aminotransferase   Date Value Ref Range Status   11/12/2021 17 10 - 60 IU/L Final     Anion Gap   Date Value Ref Range Status   12/22/2023 11 8 - 16 mmol/L Final     eGFR   Date Value Ref Range Status   12/22/2023 >60.0 >60 mL/min/1.73 m^2 Final   --no history of abnormal liver enzymes        5. Mixed hyperlipidemia  Overview:  Crestor 10 mg daily    Assessment & Plan:  LDL at goal as of 7/31/24  **continue current dose of Crestor  **recheck LDL 7/2025      6. Elevated blood pressure reading  Assessment & Plan:  BP ok today  **will continue to monitor      7. Vitamin D deficiency  Assessment & Plan:  **complete course of ergocalciferol (she took one and then forgot the rest) and recheck vit D in 3 months (12/2024)    Orders:  -     PTH, intact; Future; Expected date: 12/19/2024  -     Misc Sendout Test, Blood 25-OH vitamin D level; Future; Expected date: 12/19/2024      Follow up in about 3 months (around 12/19/2024).    Kavin Marti MD/[facility]  Internal Medicine  [facility]

## 2024-09-17 NOTE — ASSESSMENT & PLAN NOTE
Lab Results   Component Value Date    HGBA1C 8.6 (H) 07/31/2024     --unfortunately, her Ozempic required a prior auth, which didn't happen until this week  --she was checking her sugars with AM readings as high as 170 to 200  **so, she is just now starting Ozempic 0.25 mg weekly  **hold off on rechecking A1c until after she has been on the Ozempic for a few weeks

## 2024-09-17 NOTE — ASSESSMENT & PLAN NOTE
**starting Ozempic this week (~9/17/24)    CMP  Sodium   Date Value Ref Range Status   12/22/2023 142 136 - 145 mmol/L Final     Potassium   Date Value Ref Range Status   12/22/2023 5.0 3.5 - 5.1 mmol/L Final     Chloride   Date Value Ref Range Status   12/22/2023 108 95 - 110 mmol/L Final     CO2   Date Value Ref Range Status   12/22/2023 23 23 - 29 mmol/L Final     Glucose   Date Value Ref Range Status   12/22/2023 159 (H) 70 - 110 mg/dL Final     BUN   Date Value Ref Range Status   12/22/2023 14 8 - 23 mg/dL Final     Creatinine   Date Value Ref Range Status   12/22/2023 0.7 0.5 - 1.4 mg/dL Final     Calcium   Date Value Ref Range Status   12/22/2023 9.6 8.7 - 10.5 mg/dL Final     AST   Date Value Ref Range Status   08/26/2011 29 10 - 40 U/L Final     ALT   Date Value Ref Range Status   08/26/2011 35 10 - 44 U/L Final     Alanine Aminotransferase   Date Value Ref Range Status   11/12/2021 17 10 - 60 IU/L Final     Anion Gap   Date Value Ref Range Status   12/22/2023 11 8 - 16 mmol/L Final     eGFR   Date Value Ref Range Status   12/22/2023 >60.0 >60 mL/min/1.73 m^2 Final   --no history of abnormal liver enzymes

## 2024-09-17 NOTE — ASSESSMENT & PLAN NOTE
Lab Results   Component Value Date    .5 (H) 12/22/2023    CALCIUM 9.6 12/22/2023   **will follow clinically >> might be primary HPT, but only current indication for surgery would be history of kidney stones (not certain if Ca stone or not though); however she could also have secondary HPT from vitamin D def (her last level was 10)  --she took one ergocalciferol and then forgot to take the rest  **resume ergocalciferol and take weekly until course is complete  **following repletion will recheck Vit D and PTH

## 2024-09-19 ENCOUNTER — OFFICE VISIT (OUTPATIENT)
Dept: PRIMARY CARE CLINIC | Facility: CLINIC | Age: 64
End: 2024-09-19
Payer: COMMERCIAL

## 2024-09-19 VITALS
TEMPERATURE: 97 F | OXYGEN SATURATION: 98 % | WEIGHT: 214.38 LBS | SYSTOLIC BLOOD PRESSURE: 128 MMHG | BODY MASS INDEX: 40.51 KG/M2 | DIASTOLIC BLOOD PRESSURE: 78 MMHG | HEART RATE: 96 BPM

## 2024-09-19 DIAGNOSIS — R79.89 ELEVATED PARATHYROID HORMONE: ICD-10-CM

## 2024-09-19 DIAGNOSIS — E11.9 TYPE 2 DIABETES MELLITUS WITHOUT COMPLICATION, WITHOUT LONG-TERM CURRENT USE OF INSULIN: Primary | ICD-10-CM

## 2024-09-19 DIAGNOSIS — E55.9 VITAMIN D DEFICIENCY: ICD-10-CM

## 2024-09-19 DIAGNOSIS — M85.80 LOW BONE MASS: ICD-10-CM

## 2024-09-19 DIAGNOSIS — R03.0 ELEVATED BLOOD PRESSURE READING: ICD-10-CM

## 2024-09-19 DIAGNOSIS — E66.01 CLASS 2 SEVERE OBESITY DUE TO EXCESS CALORIES WITH SERIOUS COMORBIDITY AND BODY MASS INDEX (BMI) OF 38.0 TO 38.9 IN ADULT: ICD-10-CM

## 2024-09-19 DIAGNOSIS — E78.2 MIXED HYPERLIPIDEMIA: ICD-10-CM

## 2024-09-19 PROCEDURE — 99999 PR PBB SHADOW E&M-EST. PATIENT-LVL III: CPT | Mod: PBBFAC,,, | Performed by: INTERNAL MEDICINE

## 2024-09-19 NOTE — ASSESSMENT & PLAN NOTE
**complete course of ergocalciferol (she took one and then forgot the rest) and recheck vit D in 3 months (12/2024)

## 2024-10-01 ENCOUNTER — TELEPHONE (OUTPATIENT)
Dept: PHARMACY | Facility: CLINIC | Age: 64
End: 2024-10-01
Payer: COMMERCIAL

## 2024-10-01 NOTE — TELEPHONE ENCOUNTER
[facility]/Population Health Chart Review & Patient Outreach Details For Medication Adherence Project    Reason for Outreach Encounter: 3rd Party payor non-compliance report ([employer], [employer], [facility], etc)  2.  Patient Outreach Method: Reviewed Patient Chart  3.   Medication in question: metformin & rosuvastatin    LAST FILLED: 8/7/24 for 90 day supply for both   Diabetes Medications               metFORMIN (GLUCOPHAGE-XR) 500 MG ER 24hr tablet Take 2 tablets (1,000 mg total) by mouth daily with breakfast.    semaglutide (OZEMPIC) 0.25 mg or 0.5 mg (2 mg/3 mL) pen injector Inject 0.5 mg into the skin every 7 days.              Hyperlipidemia Medications               rosuvastatin (CRESTOR) 10 MG tablet Take 1 tablet (10 mg total) by mouth once daily.               4.  Reviewed and or Updates Made To: Patient Chart  5. Outreach Outcomes and/or actions taken: Patient filled medication and is on track to be adherent

## 2024-10-02 ENCOUNTER — CLINICAL SUPPORT (OUTPATIENT)
Dept: PRIMARY CARE CLINIC | Facility: CLINIC | Age: 64
End: 2024-10-02
Payer: COMMERCIAL

## 2024-10-02 DIAGNOSIS — E11.9 TYPE 2 DIABETES MELLITUS WITHOUT COMPLICATION, WITHOUT LONG-TERM CURRENT USE OF INSULIN: Primary | ICD-10-CM

## 2024-10-02 NOTE — PROGRESS NOTES
Instructed pt on use of Ozempic pen, including how to attach needle, how to prime new pen, how to select correct dosage, how to prepare skin, correct sites to give injections, how to inject medication, disposal of needle tips, and storage of medication pen.  Pt verbalized understanding of all instructions.  Pt was given Ozempic handout from [employer] and informed that there is a tutorial video on the Ozempic website.    Pt stated that she left the pen in her car for several hours, so she was advised to call the pharmacy to confirm that the pen could still be used.    Pt was also informed she could call the clinic and one of the nurses would be happy to answer any question or concern she may have with the first injection.

## 2024-10-25 ENCOUNTER — OFFICE VISIT (OUTPATIENT)
Dept: URGENT CARE | Facility: CLINIC | Age: 64
End: 2024-10-25
Payer: COMMERCIAL

## 2024-10-25 VITALS
HEIGHT: 60 IN | OXYGEN SATURATION: 98 % | TEMPERATURE: 99 F | WEIGHT: 212.88 LBS | SYSTOLIC BLOOD PRESSURE: 140 MMHG | DIASTOLIC BLOOD PRESSURE: 72 MMHG | HEART RATE: 102 BPM | RESPIRATION RATE: 12 BRPM | BODY MASS INDEX: 41.79 KG/M2

## 2024-10-25 DIAGNOSIS — R05.1 ACUTE COUGH: ICD-10-CM

## 2024-10-25 DIAGNOSIS — U07.1 COVID-19 VIRUS INFECTION: Primary | ICD-10-CM

## 2024-10-25 DIAGNOSIS — U07.1 COVID-19 VIRUS DETECTED: ICD-10-CM

## 2024-10-25 LAB
CTP QC/QA: YES
SARS-COV-2 AG RESP QL IA.RAPID: POSITIVE

## 2024-10-25 RX ORDER — BENZONATATE 100 MG/1
100 CAPSULE ORAL 3 TIMES DAILY PRN
Qty: 30 CAPSULE | Refills: 0 | Status: SHIPPED | OUTPATIENT
Start: 2024-10-25

## 2024-10-25 RX ORDER — PROMETHAZINE HYDROCHLORIDE AND DEXTROMETHORPHAN HYDROBROMIDE 6.25; 15 MG/5ML; MG/5ML
5 SYRUP ORAL EVERY 6 HOURS PRN
Qty: 120 ML | Refills: 0 | Status: SHIPPED | OUTPATIENT
Start: 2024-10-25

## 2024-10-25 NOTE — PATIENT INSTRUCTIONS
You have tested positive for COVID-19 today.      Per CDC recommendations, if you test positive for COVID-19 you may return to normal activities when, for at least 24 hours, both are true:     Your symptoms are getting better overall, and:  You have not had a fever AND are not using fever reducing medication     The CDC also recommends added precautions in the 5 days after return to normal activity including frequent hand washing, mask wearing, physical distancing.      CDC also recommends that if you develop a fever or starts to feel worse after you have returned to normal activities, you should return home and away from others for at least another 24 hours. The link below is a direct link to the CDC website with all this information.     https://www.cdc.gov/respiratory-viruses/prevention/precautions-when-sick.html    This is the most important part, both the CDC and the [facility] emphasize that you do not test out of isolation.  In fact, we do not retest if you were positive in the last 90 days.      During quarantine:   Separate yourself from other people in your home.  Call ahead before visiting your doctor.  Wear a facemask if you have to leave your home or around others.  Cover your coughs and sneezes.  Wash your hands often with soap and water; hand  can be used, too.  Avoid sharing personal household items.  Wipe down surfaces used daily.  Monitor your symptoms. Seek prompt medical attention if your illness is worsening (e.g., difficulty breathing).   Before seeking care, call your healthcare provider.  If you have a medical emergency and need to call 911, notify the dispatch personnel that you have, or are being evaluated for COVID-19. If possible, put on a facemask before emergency medical services arrive.       Close contacts should also follow these recommendations:  Make sure that you understand and can help the patient follow their provider's instructions for medication(s) and care. You should help  the patient with basic needs in the home and provide support for getting groceries, prescriptions, and other personal needs.  Monitor the patient's symptoms. If the patient is getting sicker, call his or her healthcare provider and tell them that the patient has laboratory-confirmed COVID-19. If the patient has a medical emergency and you need to call 911, notify the dispatch personnel that the patient has, or is being evaluated for COVID-19.  Household members should stay in another room or be  from the patient. Household members should use a separate bedroom and bathroom, if available.  Prohibit visitors.  Household members should care for any pets in the home.  Make sure that shared spaces in the home have good air flow, such as by an air conditioner or an opened window, weather permitting.  Perform hand hygiene frequently. Wash your hands often with soap and water for at least 20 seconds or use an alcohol-based hand  (that contains > 60% alcohol) covering all surfaces of your hands and rubbing them together until they feel dry. Soap and water should be used preferentially.  Avoid touching your eyes, nose, and mouth.  The patient should wear a facemask. If the patient is not able to wear a facemask (for example, because it causes trouble breathing), caregivers should wear a mask when they are in the same room as the patient.  Wear a disposable facemask and gloves when you touch or have contact with the patient's blood, stool, or body fluids, such as saliva, sputum, nasal mucus, vomit, urine.  Throw out disposable facemasks and gloves after using them. Do not reuse.  When removing personal protective equipment, first remove and dispose of gloves. Then, immediately clean your hands with soap and water or alcohol-based hand . Next, remove and dispose of facemask, and immediately clean your hands again with soap and water or alcohol-based hand .  You should not share dishes,  drinking glasses, cups, eating utensils, towels, bedding, or other items with the patient. After the patient uses these items, you should wash them thoroughly (see below Wash laundry thoroughly).  Clean all high-touch surfaces, such as counters, tabletops, doorknobs, bathroom fixtures, toilets, phones, keyboards, tablets, and bedside tables, every day. Also, clean any surfaces that may have blood, stool, or body fluids on them.  Use a household cleaning spray or wipe, according to the label instructions. Labels contain instructions for safe and effective use of the cleaning product including precautions you should take when applying the product, such as wearing gloves and making sure you have good ventilation during use of the product.  Wash laundry thoroughly.  Immediately remove and wash clothes or bedding that have blood, stool, or body fluids on them.  Wear disposable gloves while handling soiled items and keep soiled items away from your body. Clean your hands (with soap and water or an alcohol-based hand ) immediately after removing your gloves.  Read and follow directions on labels of laundry or clothing items and detergent. In general, using a normal laundry detergent according to washing machine instructions and dry thoroughly using the warmest temperatures recommended on the clothing label.  Place all used disposable gloves, facemasks, and other contaminated items in a lined container before disposing of them with other household waste. Clean your hands (with soap and water or an alcohol-based hand ) immediately after handling these items. Soap and water should be used preferentially if hands are visibly dirty.  Discuss any additional questions with your state or local health department or healthcare provider. Check available hours when contacting your local health department.     You must understand that you've received an Urgent Care treatment only and that you may be released  before all your medical problems are known or treated. You, the patient, will arrange for follow up care as instructed. Follow up with your PCP or specialty clinic as directed within 2-5 days if not improved or as needed.  You can call [phone] to schedule an appointment with the appropriate provider.  If your condition worsens we recommend that you receive another evaluation at the emergency room immediately or contact your primary medical clinics after hours call service to discuss your concerns.  Please return here or go to the Emergency Department for any concerns or worsening of condition.

## 2024-10-25 NOTE — PROGRESS NOTES
Subjective:      Patient ID: Lexis Allen is a 64 y.o. female.    Vitals:  height is 5' (1.524 m) and weight is 96.6 kg (212 lb 13.7 oz). Her oral temperature is 98.9 °F (37.2 °C). Her blood pressure is 140/72 (abnormal) and her pulse is 102. Her respiration is 12 and oxygen saturation is 98%.     Chief Complaint: Cough    Patient present today with sinus congestion, sore throat, cough. Patient states this started Wednesday. Patient was in [place] last weekend for her daughters wedding. Denies fever, sob/wheezing or chest pain. Reports cough has gotten worse over the past 2 days despite trying otc cough medication. Patient has had her flu shot about a month ago.    Cough  This is a new problem. The current episode started in the past 7 days. The problem has been gradually worsening. The problem occurs constantly. The cough is Productive of sputum. Associated symptoms include chills, nasal congestion, rhinorrhea, a sore throat and sweats. Pertinent negatives include no chest pain, ear congestion, ear pain, fever, headaches, hemoptysis, myalgias, rash, shortness of breath or wheezing. Treatments tried: non drowsy cold and sinus medicine. The treatment provided no relief. Her past medical history is significant for pneumonia. There is no history of asthma.       Constitution: Positive for chills. Negative for fever.   HENT:  Positive for congestion and sore throat. Negative for ear pain and trouble swallowing.    Neck: neck negative.   Cardiovascular:  Negative for chest pain and sob on exertion.   Respiratory:  Positive for cough. Negative for bloody sputum, shortness of breath, wheezing and asthma.    Gastrointestinal: Negative.    Musculoskeletal:  Negative for muscle ache.   Skin:  Negative for rash.   Allergic/Immunologic: Negative for asthma.   Neurological:  Negative for headaches.      Objective:     Physical Exam   Constitutional: She appears well-developed.  Non-toxic appearance. She appears ill. No  distress.   HENT:   Head: Normocephalic and atraumatic.   Ears:   Right Ear: External ear normal.   Left Ear: External ear normal.   Nose: Congestion present.   Mouth/Throat: Mucous membranes are moist. Posterior oropharyngeal erythema present. No oropharyngeal exudate.   Eyes: Conjunctivae and EOM are normal.   Neck: Neck supple.   Cardiovascular: Normal rate, regular rhythm and normal heart sounds.   Pulmonary/Chest: Effort normal and breath sounds normal. No respiratory distress. She has no wheezes.         Comments: Persistent dry cough    Abdominal: Normal appearance.   Musculoskeletal: Normal range of motion.         General: Normal range of motion.   Lymphadenopathy:     She has no cervical adenopathy.   Neurological: no focal deficit. She is alert. She displays no weakness. Gait normal.   Skin: Skin is warm, dry, not diaphoretic, not pale and no rash.   Psychiatric: Her behavior is normal.     Results for orders placed or performed in visit on 10/25/24   SARS Coronavirus 2 Antigen, POCT Manual Read    Collection Time: 10/25/24  2:03 PM   Result Value Ref Range    SARS Coronavirus 2 Antigen Positive (A) Negative     Acceptable Yes        Assessment:     1. COVID-19 virus infection    2. Acute cough        Plan:     - Rapid COVID-19 positive    - Covid Risk Score: 3  Pt considered moderate/high risk (score >1) and therefor qualifies for Paxlovid. Discussed with pt that this treatment is optional, but should be started within first 5 days of symptoms if taken. Also discussed common side effects of medication. Pt was advised to discontinue Rosuvastatin while taking Paxlovid due to drug-to-drug interactions. Pt voiced understanding.   - Advised patient to stay home and self quarantine until symptoms are getting better overall AND they are fever free for at least 24 hours without fever reducing medications.   - Tylenol and/or NSAIDs as needed for fever/pain control.   - Promethazine vs. Tessalon  perles prn for cough.  - Rest and drink plenty of fluids.  - Strict ED precautions given for any emergent symptoms.    COVID-19 virus infection  -     nirmatrelvir-ritonavir 300 mg (150 mg x 2)-100 mg copackaged tablets (EUA); Take 3 tablets by mouth 2 (two) times daily for 5 days. Each dose contains 2 nirmatrelvir (pink tablets) and 1 ritonavir (white tablet). Take all 3 tablets together  Dispense: 30 tablet; Refill: 0    Acute cough  -     SARS Coronavirus 2 Antigen, POCT Manual Read  -     promethazine-dextromethorphan (PROMETHAZINE-DM) 6.25-15 mg/5 mL Syrp; Take 5 mLs by mouth every 6 (six) hours as needed (cough). May cause drowsiness.  Dispense: 120 mL; Refill: 0  -     benzonatate (TESSALON PERLES) 100 MG capsule; Take 1 capsule (100 mg total) by mouth 3 (three) times daily as needed for Cough.  Dispense: 30 capsule; Refill: 0

## 2024-11-07 ENCOUNTER — TELEPHONE (OUTPATIENT)
Dept: PHARMACY | Facility: CLINIC | Age: 64
End: 2024-11-07
Payer: COMMERCIAL

## 2024-11-08 NOTE — TELEPHONE ENCOUNTER
[facility] Chart Review & Patient Outreach Details For Medication Adherence Project    Reason for Outreach Encounter: 3rd Party payor non-compliance report ([employer], [employer], C, etc)  2.  Patient Outreach Method: Reviewed patient chart  and Applic[employer]at message  3.   Medication in question:   Diabetes Medications               metFORMIN (GLUCOPHAGE-XR) 500 MG ER 24hr tablet Take 2 tablets (1,000 mg total) by mouth daily with breakfast.    semaglutide (OZEMPIC) 0.25 mg or 0.5 mg (2 mg/3 mL) pen injector Inject 0.5 mg into the skin every 7 days.              Hyperlipidemia Medications               rosuvastatin (CRESTOR) 10 MG tablet Take 1 tablet (10 mg total) by mouth once daily.                  METFORMIN AND ROSUVASTATIN  last filled  8/7 for 90 day supply      4.  Reviewed and or Updates Made To: Patient Chart  5. Outreach Outcomes and/or actions taken: Sent inquiry to patient: Waiting for response  Additional Notes:

## 2024-12-17 ENCOUNTER — TELEPHONE (OUTPATIENT)
Dept: PHARMACY | Facility: CLINIC | Age: 64
End: 2024-12-17
Payer: COMMERCIAL

## 2024-12-17 NOTE — TELEPHONE ENCOUNTER
[facility] Chart Review & Patient Outreach Details For Medication Adherence Project    Reason for Outreach Encounter: 3rd Party payor non-compliance report ([employer], [employer], C, etc)  2.  Patient Outreach Method: Reviewed Patient Chart  3.   Medication in question: rosuvastatin   LAST FILLED: 11/10/24 for 90 day supply  Hyperlipidemia Medications               rosuvastatin (CRESTOR) 10 MG tablet Take 1 tablet (10 mg total) by mouth once daily.               4.  Reviewed and or Updates Made To: Patient Chart  5. Outreach Outcomes and/or actions taken: Patient filled medication and is on track to be adherent

## 2024-12-18 ENCOUNTER — TELEPHONE (OUTPATIENT)
Dept: PHARMACY | Facility: CLINIC | Age: 64
End: 2024-12-18
Payer: COMMERCIAL

## 2024-12-18 DIAGNOSIS — E55.9 VITAMIN D DEFICIENCY: ICD-10-CM

## 2024-12-18 NOTE — TELEPHONE ENCOUNTER
[facility] Chart Review & Patient Outreach Details For Medication Adherence Project    Reason for Outreach Encounter: 3rd Party payor non-compliance report ([employer], [employer], C, etc)  2.  Patient Outreach Method: Reviewed patient chart   3.   Medication in question:    Diabetes Medications               metFORMIN (GLUCOPHAGE-XR) 500 MG ER 24hr tablet Take 2 tablets (1,000 mg total) by mouth daily with breakfast.    semaglutide (OZEMPIC) 0.25 mg or 0.5 mg (2 mg/3 mL) pen injector Inject 0.5 mg into the skin every 7 days.                 metformin  last filled  11/10 for 90 day supply    4.  Reviewed and or Updates Made To: Patient Chart  5. Outreach Outcomes and/or actions taken: Patient filled medication and is on track to be adherent  Additional Notes:

## 2024-12-19 RX ORDER — ERGOCALCIFEROL 1.25 MG/1
CAPSULE ORAL
Qty: 8 CAPSULE | Refills: 0 | Status: SHIPPED | OUTPATIENT
Start: 2024-12-19

## 2025-01-17 ENCOUNTER — CLINICAL SUPPORT (OUTPATIENT)
Dept: PRIMARY CARE CLINIC | Facility: CLINIC | Age: 65
End: 2025-01-17
Payer: COMMERCIAL

## 2025-01-17 DIAGNOSIS — E11.9 TYPE 2 DIABETES MELLITUS WITHOUT COMPLICATION, WITHOUT LONG-TERM CURRENT USE OF INSULIN: Primary | ICD-10-CM

## 2025-01-17 NOTE — PROGRESS NOTES
Pt brought in Ozempic pen for instruction- went over instructions for administering Ozempic- pt repeated instructions then administered shot.  Pt tolerated well.  Handout given to pt with instructions.  Advised pt to contact the clinic with any concerns.  Pt verbalized understanding.

## 2025-01-21 ENCOUNTER — TELEPHONE (OUTPATIENT)
Dept: PHARMACY | Facility: CLINIC | Age: 65
End: 2025-01-21
Payer: COMMERCIAL

## 2025-01-21 NOTE — TELEPHONE ENCOUNTER
[facility] Chart Review & Patient Outreach Details For Medication Adherence Project    Reason for Outreach Encounter: 3rd Party payor non-compliance report ([employer], [employer], C, etc)  2.  Patient Outreach Method: Reviewed patient chart   3.   Medication in question:    Diabetes Medications               metFORMIN (GLUCOPHAGE-XR) 500 MG ER 24hr tablet Take 2 tablets (1,000 mg total) by mouth daily with breakfast.    semaglutide (OZEMPIC) 0.25 mg or 0.5 mg (2 mg/3 mL) pen injector Inject 0.5 mg into the skin every 7 days.                 Metformin  last filled  11/10/24 for 90 day supply      4.  Reviewed and or Updates Made To: Patient Chart  5. Outreach Outcomes and/or actions taken: Patient filled medication and is on track to be adherent  Additional Notes:

## 2025-02-12 RX ORDER — CALCIUM CITRATE/VITAMIN D3 200MG-6.25
TABLET ORAL
Qty: 100 STRIP | Refills: 5 | Status: SHIPPED | OUTPATIENT
Start: 2025-02-12

## 2025-02-24 DIAGNOSIS — E11.9 TYPE 2 DIABETES MELLITUS WITHOUT COMPLICATION, WITHOUT LONG-TERM CURRENT USE OF INSULIN: ICD-10-CM

## 2025-02-24 RX ORDER — SEMAGLUTIDE 0.68 MG/ML
0.5 INJECTION, SOLUTION SUBCUTANEOUS
Qty: 3 ML | Refills: 3 | Status: SHIPPED | OUTPATIENT
Start: 2025-02-24

## 2025-03-12 DIAGNOSIS — E66.812 CLASS 2 SEVERE OBESITY DUE TO EXCESS CALORIES WITH SERIOUS COMORBIDITY AND BODY MASS INDEX (BMI) OF 38.0 TO 38.9 IN ADULT: ICD-10-CM

## 2025-03-12 DIAGNOSIS — E66.01 CLASS 2 SEVERE OBESITY DUE TO EXCESS CALORIES WITH SERIOUS COMORBIDITY AND BODY MASS INDEX (BMI) OF 38.0 TO 38.9 IN ADULT: ICD-10-CM

## 2025-03-12 DIAGNOSIS — E11.9 TYPE 2 DIABETES MELLITUS WITHOUT COMPLICATION, WITHOUT LONG-TERM CURRENT USE OF INSULIN: Primary | ICD-10-CM

## 2025-03-13 ENCOUNTER — TELEPHONE (OUTPATIENT)
Dept: PRIMARY CARE CLINIC | Facility: CLINIC | Age: 65
End: 2025-03-13
Payer: COMMERCIAL

## 2025-03-13 NOTE — TELEPHONE ENCOUNTER
----- Message from Kavin Marti MD sent at 3/13/2025  3:18 PM CDT -----  Lexis Matson.  ----- Message -----  From: Sheyla Barajas MA  Sent: 3/13/2025   2:24 PM CDT  To: Kavin Marti MD    What pt?  ----- Message -----  From: Kavin Marti MD  Sent: 3/12/2025   9:14 AM CDT  To: IGOR Pino, this patient would like to get her labs done in [place] in the first week of April.  Could you reach out to her and help get them scheduled?  She will need urine for microalb, A1c, vitamin D, PTH, A1c, CMP, and CBC.

## 2025-04-03 ENCOUNTER — TELEPHONE (OUTPATIENT)
Dept: PHARMACY | Facility: CLINIC | Age: 65
End: 2025-04-03
Payer: COMMERCIAL

## 2025-04-03 NOTE — TELEPHONE ENCOUNTER
[facility] Chart Review & Patient Outreach Details For Medication Adherence Project    Reason for Outreach Encounter: 3rd Party payor non-compliance report ([employer], [employer], C, etc)  2.  Patient Outreach Method: Reviewed patient chart  and Telephone call  3.   Medication in question:    Diabetes Medications              metFORMIN (GLUCOPHAGE-XR) 500 MG ER 24hr tablet Take 2 tablets (1,000 mg total) by mouth daily with breakfast.    semaglutide (OZEMPIC) 0.25 mg or 0.5 mg (2 mg/3 mL) pen injector Inject 0.5 mg into the skin every 7 days.                 ozempic  last filled  2/26 for 28 day supply    4.  Reviewed and or Updates Made To: Patient Chart  5. Outreach Outcomes and/or actions taken: Patient didn't answer. Closed encounter.  Additional Notes:

## 2025-04-11 ENCOUNTER — LAB VISIT (OUTPATIENT)
Dept: LAB | Facility: HOSPITAL | Age: 65
End: 2025-04-11
Attending: INTERNAL MEDICINE
Payer: COMMERCIAL

## 2025-04-11 DIAGNOSIS — E66.812 CLASS 2 SEVERE OBESITY DUE TO EXCESS CALORIES WITH SERIOUS COMORBIDITY AND BODY MASS INDEX (BMI) OF 38.0 TO 38.9 IN ADULT: ICD-10-CM

## 2025-04-11 DIAGNOSIS — E55.9 VITAMIN D DEFICIENCY: ICD-10-CM

## 2025-04-11 DIAGNOSIS — E11.9 TYPE 2 DIABETES MELLITUS WITHOUT COMPLICATION, WITHOUT LONG-TERM CURRENT USE OF INSULIN: ICD-10-CM

## 2025-04-11 DIAGNOSIS — R79.89 ELEVATED PARATHYROID HORMONE: ICD-10-CM

## 2025-04-11 DIAGNOSIS — E66.01 CLASS 2 SEVERE OBESITY DUE TO EXCESS CALORIES WITH SERIOUS COMORBIDITY AND BODY MASS INDEX (BMI) OF 38.0 TO 38.9 IN ADULT: ICD-10-CM

## 2025-04-11 LAB
ALBUMIN/CREAT UR: 25.8 UG/MG
CREAT UR-MCNC: 31 MG/DL (ref 15–325)
MICROALBUMIN UR-MCNC: 8 UG/ML (ref ?–5000)

## 2025-04-11 PROCEDURE — 83036 HEMOGLOBIN GLYCOSYLATED A1C: CPT

## 2025-04-11 PROCEDURE — 85025 COMPLETE CBC W/AUTO DIFF WBC: CPT

## 2025-04-11 PROCEDURE — 82570 ASSAY OF URINE CREATININE: CPT

## 2025-04-11 PROCEDURE — 36415 COLL VENOUS BLD VENIPUNCTURE: CPT | Mod: PN

## 2025-04-11 PROCEDURE — 80053 COMPREHEN METABOLIC PANEL: CPT

## 2025-04-11 PROCEDURE — 83970 ASSAY OF PARATHORMONE: CPT

## 2025-04-12 LAB
ABSOLUTE EOSINOPHIL (OHS): 0.09 K/UL
ABSOLUTE MONOCYTE (OHS): 0.52 K/UL (ref 0.3–1)
ABSOLUTE NEUTROPHIL COUNT (OHS): 3.41 K/UL (ref 1.8–7.7)
ALBUMIN SERPL BCP-MCNC: 3.8 G/DL (ref 3.5–5.2)
ALP SERPL-CCNC: 98 UNIT/L (ref 40–150)
ALT SERPL W/O P-5'-P-CCNC: 20 UNIT/L (ref 10–44)
ANION GAP (OHS): 10 MMOL/L (ref 8–16)
AST SERPL-CCNC: 23 UNIT/L (ref 11–45)
BASOPHILS # BLD AUTO: 0.02 K/UL
BASOPHILS NFR BLD AUTO: 0.4 %
BILIRUB SERPL-MCNC: 0.5 MG/DL (ref 0.1–1)
BUN SERPL-MCNC: 13 MG/DL (ref 8–23)
CALCIUM SERPL-MCNC: 9.7 MG/DL (ref 8.7–10.5)
CHLORIDE SERPL-SCNC: 107 MMOL/L (ref 95–110)
CO2 SERPL-SCNC: 25 MMOL/L (ref 23–29)
CREAT SERPL-MCNC: 0.7 MG/DL (ref 0.5–1.4)
EAG (OHS): 160 MG/DL (ref 68–131)
ERYTHROCYTE [DISTWIDTH] IN BLOOD BY AUTOMATED COUNT: 14.1 % (ref 11.5–14.5)
GFR SERPLBLD CREATININE-BSD FMLA CKD-EPI: >60 ML/MIN/1.73/M2
GLUCOSE SERPL-MCNC: 131 MG/DL (ref 70–110)
HBA1C MFR BLD: 7.2 % (ref 4–5.6)
HCT VFR BLD AUTO: 41.1 % (ref 37–48.5)
HGB BLD-MCNC: 12.7 GM/DL (ref 12–16)
IMM GRANULOCYTES # BLD AUTO: 0.02 K/UL (ref 0–0.04)
IMM GRANULOCYTES NFR BLD AUTO: 0.4 % (ref 0–0.5)
LYMPHOCYTES # BLD AUTO: 1.43 K/UL (ref 1–4.8)
MCH RBC QN AUTO: 27.2 PG (ref 27–31)
MCHC RBC AUTO-ENTMCNC: 30.9 G/DL (ref 32–36)
MCV RBC AUTO: 88 FL (ref 82–98)
NUCLEATED RBC (/100WBC) (OHS): 0 /100 WBC
PLATELET # BLD AUTO: 285 K/UL (ref 150–450)
PMV BLD AUTO: 9.4 FL (ref 9.2–12.9)
POTASSIUM SERPL-SCNC: 4.3 MMOL/L (ref 3.5–5.1)
PROT SERPL-MCNC: 8 GM/DL (ref 6–8.4)
RBC # BLD AUTO: 4.67 M/UL (ref 4–5.4)
RELATIVE EOSINOPHIL (OHS): 1.6 %
RELATIVE LYMPHOCYTE (OHS): 26 % (ref 18–48)
RELATIVE MONOCYTE (OHS): 9.5 % (ref 4–15)
RELATIVE NEUTROPHIL (OHS): 62.1 % (ref 38–73)
SODIUM SERPL-SCNC: 142 MMOL/L (ref 136–145)
WBC # BLD AUTO: 5.49 K/UL (ref 3.9–12.7)

## 2025-04-14 ENCOUNTER — RESULTS FOLLOW-UP (OUTPATIENT)
Dept: PRIMARY CARE CLINIC | Facility: CLINIC | Age: 65
End: 2025-04-14

## 2025-04-15 PROBLEM — Z12.11 COLON CANCER SCREENING: Status: ACTIVE | Noted: 2025-04-15

## 2025-04-15 LAB — PTH-INTACT SERPL-MCNC: 54 PG/ML (ref 9–77)

## 2025-04-15 NOTE — ASSESSMENT & PLAN NOTE
Vit D, 25-Hydroxy (ng/mL)   Date Value   07/31/2024 10 (L)   **need to recheck vitamin D level following repletion with ergocalciferol

## 2025-04-15 NOTE — ASSESSMENT & PLAN NOTE
UACR Microalbumin/Creatinine Ratio Urine   Date Value Ref Range Status   04/11/2025 25.8 <=30.0 ug/mg Final      A1c Lab Results   Component Value Date    HGBA1C 7.2 (H) 04/11/2025      LDL 65 as of 7/31/24   --A1c and LDL both at goal  --no albuminuria

## 2025-04-15 NOTE — PROGRESS NOTES
[facility] Appointment      Subjective:      Patient ID: Lexis Allen is a 64 y.o. female.    Chief Complaint: Follow-up (Pt is here to discuss lab results. )      HPI:    History of Present Illness    CHIEF COMPLAINT:  Lexis presents today for diabetes follow-up.    DIABETES:  Her A1C has improved from 8.6 nine months ago to 7.2 currently. She reports fasting blood sugar between 137-147. She continues Ozempic 0.5mg weekly with good tolerance and Metformin with occasional GI urgency.    WEIGHT MANAGEMENT AND EXERCISE:  She has lost 10 lbs over the past two months, with significant weight loss from 242 lbs in 2017 to current weight of 196 lbs. She currently walks to the mailbox, approximately three blocks round trip, and is considering purchasing a bicycle for additional exercise.    DIET:  She reports improved dietary habits, following recommendations to pair carbohydrates with protein and salad, avoiding consumption of carbohydrates alone.    BONE HEALTH:  Bone density scan in June of previous year showed osteopenia. She understands this indicates slightly thinner bones than expected for her age, but not to the level of osteoporosis. She has been advised on management strategies including vitamin D and calcium supplementation.    FAMILY HISTORY:  Mother had kidney stones in her 30s leading to kidney failure requiring nephrectomy, triple bypass surgery in early 60s, diabetes, hypertension, and heart attack at age 61.    LABS:  LDL cholesterol was 65 in July. She has history of vitamin D deficiency with elevated parathyroid hormone that has normalized with treatment.      ROS:  General: no fever, no chills, no fatigue, no weight gain, reports weight loss, reports change in diet  Eyes: no vision changes, no redness, no discharge  ENT: no ear pain, no nasal congestion, no sore throat  Cardiovascular: no chest pain, no palpitations, no lower extremity edema  Respiratory: no cough, no shortness of  breath  Gastrointestinal: no abdominal pain, no nausea, no vomiting, reports diarrhea, no constipation, no blood in stool  Genitourinary: no dysuria, no hematuria, no frequency  Musculoskeletal: no joint pain, no muscle pain  Skin: no rash, no lesion  Neurological: no headache, no dizziness, no numbness, no tingling  Psychiatric: no anxiety, no depression, no sleep difficulty             4/17/2025 12/16/2022   PHQ-9 Depression Patient Health Questionnaire   Over the last two weeks how often have you been bothered by little interest or pleasure in doing things 0 0    Over the last two weeks how often have you been bothered by feeling down, depressed or hopeless 0 0       Data saved with a previous flowsheet row definition           No data to display                  Objective:   Mental Health Screening  PHQ-9  Depression Patient Health Questionnaire (PHQ-9)  Over the last two weeks how often have you been bothered by little interest or pleasure in doing things: Not at all  Over the last two weeks how often have you been bothered by feeling down, depressed or hopeless: Not at all    PHYSICAL EXAM   Vital Signs  /72 (BP Location: Left arm, Patient Position: Sitting)   Pulse 100   Temp 96.9 °F (36.1 °C) (Temporal)   Ht 5' (1.524 m)   Wt 89.4 kg (197 lb 1.5 oz)   SpO2 98%   BMI 38.49 kg/m²     Physical Exam    General: Well-developed. Well-nourished. No acute distress.  Eyes: EOMI. Sclerae anicteric.  HENT: Normocephalic. Atraumatic. Nares patent. Moist oral mucosa.  Cardiovascular: Regular rate. Regular rhythm. No murmurs. No rubs. No gallops. Normal S1, S2. Normal dorsalis pedis and posterior tibial pulses bilaterally.  Respiratory: Normal respiratory effort. Clear to auscultation bilaterally. No rales. No rhonchi. No wheezing.  Musculoskeletal: No  obvious deformity.  Extremities: No lower extremity edema.  Neurological: Alert & oriented x3. No slurred speech. Normal gait.  Psychiatric: Normal mood.  Normal affect. Good insight. Good judgment.  Skin: Warm. Dry. No rash.       Protective Sensation (w/ 10 gram monofilament):  Right: Intact  Left: Intact    Visual Inspection:  Normal -  Bilateral    Pedal Pulses:   Right: Present  Left: Present    Posterior Tibialis Pulses:   Right:Present  Left: Present   Assessment:   64 y.o. female here for primary care visit       Plan:   1. Type 2 diabetes mellitus without complication, without long-term current use of insulin  Overview:  Current regimen:  --Metformin ER 1000 mg daily  --Ozempic 0.5 mg weekly    No albuminuria as of 12/22/23  -due for foot exam (no neuropathy) as of 3/7/24  -due for eye exam (no retinopathy) -- needs diabetic eye photo or optometry eval -- ordered 3/7/24    Assessment & Plan:  UACR Microalbumin/Creatinine Ratio Urine   Date Value Ref Range Status   04/11/2025 25.8 <=30.0 ug/mg Final      A1c Lab Results   Component Value Date    HGBA1C 7.2 (H) 04/11/2025      LDL 65 as of 7/31/24   --A1c and LDL both at goal  --no albuminuria    Orders:  -     Hemoglobin A1C; Future; Expected date: 07/16/2025  -     semaglutide (OZEMPIC) 1 mg/dose (4 mg/3 mL); Inject 1 mg into the skin every 7 days.  Dispense: 9 mL; Refill: 3    2. Class 2 severe obesity due to excess calories with serious comorbidity and body mass index (BMI) of 38.0 to 38.9 in adult  Overview:  Her highest weight ever was 242 lbs in 2017  Weight 201 lbs as of 12/2021  Weight 203 lbs as of 12/2022  Weight 214 lbs as of 3/7/24  Weight 197 lbs as of 4/17/25    Current regimen:  --Ozempic 0.5 mg weekly    Co-morbidities include DM  No evidence of MASLD on imaging or labs    Assessment & Plan:  FIB-4 Calculation: 1.15 at 4/11/2025  7:47 AM  Calculated from:  SGOT/AST: 23 unit/L at 4/11/2025  7:47 AM  SGPT/ALT: 20 unit/L at 4/11/2025  7:47 AM  Platelets: 285 K/uL at 4/11/2025  7:47 AM  Age: 64 years     --Fib4 score is low risk, especially for age  **no further eval at this time      3. Mixed  hyperlipidemia  Overview:  Crestor 10 mg daily    Assessment & Plan:  **due to recheck LDL 7/2025    Orders:  -     rosuvastatin (CRESTOR) 10 MG tablet; Take 1 tablet (10 mg total) by mouth once daily.  Dispense: 90 tablet; Refill: 3  -     Lipid Panel; Future; Expected date: 07/16/2025    4. Vitamin D deficiency  Assessment & Plan:  Vit D, 25-Hydroxy (ng/mL)   Date Value   07/31/2024 10 (L)   **need to recheck vitamin D level following repletion with ergocalciferol    Orders:  -     Vitamin D; Future; Expected date: 07/16/2025    5. Elevated blood pressure reading  Assessment & Plan:  Discussed the benefits of intensive BP control in persons with type 2 DM  **add valsartan 40 mg daily  (Discussed that she might be able get off this one eventually with enough weight loss)    Orders:  -     valsartan (DIOVAN) 40 MG tablet; Take 1 tablet (40 mg total) by mouth once daily.  Dispense: 90 tablet; Refill: 3    6. Colon cancer screening  Assessment & Plan:  **due for colon CA screening      7. Elevated parathyroid hormone  Overview:  24 hour urine calcium from 3/11/24 elevated at 26 (ULN 12)  + history of kidney stones with elevated PTH (117.5)  --but she is normocalcemic with normal eGFR   6/4/24 DEXA:  low bone mass  Vitamin D from 7/2024: 10    Assessment & Plan:  Lab Results   Component Value Date    PTH 54.0 04/11/2025    CALCIUM 9.7 04/11/2025   --PTH has normalized following vitamin D repletion  **will monitor over time      8. Encounter for screening mammogram for malignant neoplasm of breast  Assessment & Plan:  **due for mammogram    Orders:  -     Mammo Digital Screening Bilat w/ Joaquin (XPD); Future; Expected date: 04/17/2025    9. Low bone mass  Overview:  DEXA 6/4/24:  FINDINGS:  The L1 to L4 vertebral bone mineral density is equal to 1.14 g/cm squared with a T score of -0.4.  The left femoral neck bone mineral density is equal to 0.79 g/cm squared with a T score of -1.8.  There is a 8.6% risk of a major  osteoporotic fracture and a 1.0% risk of hip fracture in the next 10 years (FRAX).     Impression:  Osteopenia    **recheck Dexa 6/4/26    Assessment & Plan:  **advised Ca and Vitamin D3 supplementation aiming for 800-1000 mg dietary and/or supplemental Ca and 2000 un of D3  **recheck DEXA 6/2026        Follow up in about 3 months (around 7/17/2025).    Kavin Marti MD/[facility]  [facility]  [facility]

## 2025-04-16 NOTE — ASSESSMENT & PLAN NOTE
FIB-4 Calculation: 1.15 at 4/11/2025  7:47 AM  Calculated from:  SGOT/AST: 23 unit/L at 4/11/2025  7:47 AM  SGPT/ALT: 20 unit/L at 4/11/2025  7:47 AM  Platelets: 285 K/uL at 4/11/2025  7:47 AM  Age: 64 years     --Fib4 score is low risk, especially for age  **no further eval at this time

## 2025-04-16 NOTE — ASSESSMENT & PLAN NOTE
Lab Results   Component Value Date    PTH 54.0 04/11/2025    CALCIUM 9.7 04/11/2025   --PTH has normalized following vitamin D repletion  **will monitor over time

## 2025-04-17 ENCOUNTER — OFFICE VISIT (OUTPATIENT)
Dept: PRIMARY CARE CLINIC | Facility: CLINIC | Age: 65
End: 2025-04-17
Payer: COMMERCIAL

## 2025-04-17 VITALS
SYSTOLIC BLOOD PRESSURE: 134 MMHG | OXYGEN SATURATION: 98 % | WEIGHT: 197.06 LBS | HEART RATE: 100 BPM | TEMPERATURE: 97 F | BODY MASS INDEX: 38.69 KG/M2 | DIASTOLIC BLOOD PRESSURE: 72 MMHG | HEIGHT: 60 IN

## 2025-04-17 DIAGNOSIS — Z12.11 COLON CANCER SCREENING: ICD-10-CM

## 2025-04-17 DIAGNOSIS — M85.80 LOW BONE MASS: ICD-10-CM

## 2025-04-17 DIAGNOSIS — E66.812 CLASS 2 SEVERE OBESITY DUE TO EXCESS CALORIES WITH SERIOUS COMORBIDITY AND BODY MASS INDEX (BMI) OF 38.0 TO 38.9 IN ADULT: ICD-10-CM

## 2025-04-17 DIAGNOSIS — Z12.31 ENCOUNTER FOR SCREENING MAMMOGRAM FOR MALIGNANT NEOPLASM OF BREAST: ICD-10-CM

## 2025-04-17 DIAGNOSIS — E55.9 VITAMIN D DEFICIENCY: ICD-10-CM

## 2025-04-17 DIAGNOSIS — E78.2 MIXED HYPERLIPIDEMIA: ICD-10-CM

## 2025-04-17 DIAGNOSIS — R79.89 ELEVATED PARATHYROID HORMONE: ICD-10-CM

## 2025-04-17 DIAGNOSIS — E11.9 TYPE 2 DIABETES MELLITUS WITHOUT COMPLICATION, WITHOUT LONG-TERM CURRENT USE OF INSULIN: Primary | ICD-10-CM

## 2025-04-17 DIAGNOSIS — E66.01 CLASS 2 SEVERE OBESITY DUE TO EXCESS CALORIES WITH SERIOUS COMORBIDITY AND BODY MASS INDEX (BMI) OF 38.0 TO 38.9 IN ADULT: ICD-10-CM

## 2025-04-17 DIAGNOSIS — R03.0 ELEVATED BLOOD PRESSURE READING: ICD-10-CM

## 2025-04-17 PROCEDURE — 3075F SYST BP GE 130 - 139MM HG: CPT | Mod: CPTII,S$GLB,, | Performed by: INTERNAL MEDICINE

## 2025-04-17 PROCEDURE — 3061F NEG MICROALBUMINURIA REV: CPT | Mod: CPTII,S$GLB,, | Performed by: INTERNAL MEDICINE

## 2025-04-17 PROCEDURE — 99999 PR PBB SHADOW E&M-EST. PATIENT-LVL IV: CPT | Mod: PBBFAC,,, | Performed by: INTERNAL MEDICINE

## 2025-04-17 PROCEDURE — 1160F RVW MEDS BY RX/DR IN RCRD: CPT | Mod: CPTII,S$GLB,, | Performed by: INTERNAL MEDICINE

## 2025-04-17 PROCEDURE — 3078F DIAST BP <80 MM HG: CPT | Mod: CPTII,S$GLB,, | Performed by: INTERNAL MEDICINE

## 2025-04-17 PROCEDURE — 3008F BODY MASS INDEX DOCD: CPT | Mod: CPTII,S$GLB,, | Performed by: INTERNAL MEDICINE

## 2025-04-17 PROCEDURE — 99214 OFFICE O/P EST MOD 30 MIN: CPT | Mod: S$GLB,,, | Performed by: INTERNAL MEDICINE

## 2025-04-17 PROCEDURE — 3066F NEPHROPATHY DOC TX: CPT | Mod: CPTII,S$GLB,, | Performed by: INTERNAL MEDICINE

## 2025-04-17 PROCEDURE — 4010F ACE/ARB THERAPY RXD/TAKEN: CPT | Mod: CPTII,S$GLB,, | Performed by: INTERNAL MEDICINE

## 2025-04-17 PROCEDURE — 3051F HG A1C>EQUAL 7.0%<8.0%: CPT | Mod: CPTII,S$GLB,, | Performed by: INTERNAL MEDICINE

## 2025-04-17 PROCEDURE — 1159F MED LIST DOCD IN RCRD: CPT | Mod: CPTII,S$GLB,, | Performed by: INTERNAL MEDICINE

## 2025-04-17 RX ORDER — ROSUVASTATIN CALCIUM 10 MG/1
10 TABLET, COATED ORAL DAILY
Qty: 90 TABLET | Refills: 3 | Status: SHIPPED | OUTPATIENT
Start: 2025-04-17 | End: 2026-04-17

## 2025-04-17 RX ORDER — SEMAGLUTIDE 1.34 MG/ML
1 INJECTION, SOLUTION SUBCUTANEOUS
Qty: 9 ML | Refills: 3 | Status: SHIPPED | OUTPATIENT
Start: 2025-04-17 | End: 2026-04-17

## 2025-04-17 RX ORDER — VALSARTAN 40 MG/1
40 TABLET ORAL DAILY
Qty: 90 TABLET | Refills: 3 | Status: SHIPPED | OUTPATIENT
Start: 2025-04-17 | End: 2026-04-17

## 2025-04-17 NOTE — ASSESSMENT & PLAN NOTE
Discussed the benefits of intensive BP control in persons with type 2 DM  **add valsartan 40 mg daily  (Discussed that she might be able get off this one eventually with enough weight loss)

## 2025-04-17 NOTE — ASSESSMENT & PLAN NOTE
**advised Ca and Vitamin D3 supplementation aiming for 800-1000 mg dietary and/or supplemental Ca and 2000 un of D3  **recheck DEXA 6/2026

## 2025-05-29 DIAGNOSIS — E11.9 TYPE 2 DIABETES MELLITUS WITHOUT COMPLICATION, WITHOUT LONG-TERM CURRENT USE OF INSULIN: ICD-10-CM

## 2025-05-29 RX ORDER — SEMAGLUTIDE 1.34 MG/ML
1 INJECTION, SOLUTION SUBCUTANEOUS
Qty: 9 ML | Refills: 3 | Status: SHIPPED | OUTPATIENT
Start: 2025-05-29 | End: 2026-05-29

## 2025-05-29 NOTE — TELEPHONE ENCOUNTER
Copied from CRM #[id number]. Topic: Medications - Medication Refill  >> May 29, 2025  8:26 AM Chitra wrote:    Requesting an RX refill or new RX.    Is this a refill or new RX: refill    RX name and strength (copy/paste from chart): semaglutide (OZEMPIC) 1 mg/dose (4 mg/3 mL)     Is this a 30 day or 90 day RX:     Pharmacy name and phone # (copy/paste from chart):  [id number] - [place] - [address] [place] [address] AT [address] [place] [address]  Phone: [phone] Fax: [phone]  Hours: Not open 24 hours        The doctors have asked that we provide their patients with the following 2 reminders -- prescription refills can take up to 72 hours, and a friendly reminder that in the future you can use your [facility] account to request refills: AKILA

## 2025-08-03 ENCOUNTER — OFFICE VISIT (OUTPATIENT)
Dept: URGENT CARE | Facility: CLINIC | Age: 65
End: 2025-08-03
Payer: COMMERCIAL

## 2025-08-03 VITALS
WEIGHT: 206 LBS | OXYGEN SATURATION: 97 % | RESPIRATION RATE: 18 BRPM | SYSTOLIC BLOOD PRESSURE: 121 MMHG | HEIGHT: 61 IN | TEMPERATURE: 97 F | DIASTOLIC BLOOD PRESSURE: 82 MMHG | HEART RATE: 101 BPM | BODY MASS INDEX: 38.89 KG/M2

## 2025-08-03 DIAGNOSIS — R05.1 ACUTE COUGH: ICD-10-CM

## 2025-08-03 DIAGNOSIS — R52 GENERALIZED BODY ACHES: ICD-10-CM

## 2025-08-03 DIAGNOSIS — J02.9 SORE THROAT: ICD-10-CM

## 2025-08-03 DIAGNOSIS — U07.1 COVID-19: Primary | ICD-10-CM

## 2025-08-03 LAB
CTP QC/QA: YES
SARS-COV+SARS-COV-2 AG RESP QL IA.RAPID: POSITIVE

## 2025-08-03 PROCEDURE — 87811 SARS-COV-2 COVID19 W/OPTIC: CPT | Mod: QW,S$GLB,, | Performed by: PHYSICIAN ASSISTANT

## 2025-08-03 PROCEDURE — 99214 OFFICE O/P EST MOD 30 MIN: CPT | Mod: S$GLB,,, | Performed by: PHYSICIAN ASSISTANT

## 2025-08-03 NOTE — PROGRESS NOTES
"Subjective:      Patient ID: Lexis Allen is a 65 y.o. female.    Vitals:  height is 5' 1" (1.549 m) and weight is 93.5 kg (206 lb 0.3 oz). Her axillary temperature is 97.3 °F (36.3 °C). Her blood pressure is 121/82 and her pulse is 101. Her respiration is 18 and oxygen saturation is 97%.     Chief Complaint: Sore Throat    Pt presents with sore throat since Tuesday 07/29/2025. Pt states she started with a sore throat, and today woke up with congestion and body aches. Also reports intermittent coughing and headache. Denies fever, sob/wheezing, ear pain. Pt states she is taking Advil for sxs, last taken yesterday. 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     Sore Throat   This is a new problem. The current episode started in the past 7 days. The problem has been unchanged. Neither side of throat is experiencing more pain than the other. There has been no fever. The pain is mild. Associated symptoms include congestion, coughing and headaches. Pertinent negatives include no abdominal pain, diarrhea, drooling, ear discharge, ear pain, plugged ear sensation, neck pain, shortness of breath, stridor, swollen glands, trouble swallowing or vomiting. She has had no exposure to strep or mono. She has tried NSAIDs for the symptoms. The treatment provided no relief.       Constitution: Positive for fatigue. Negative for chills and fever.   HENT:  Positive for congestion and sore throat. Negative for ear pain, ear discharge, drooling and trouble swallowing.    Neck: Negative for neck pain.   Cardiovascular: Negative.    Respiratory:  Positive for cough. Negative for shortness of breath, stridor and wheezing.    Gastrointestinal:  Negative for abdominal pain, vomiting and diarrhea.   Musculoskeletal:  Positive for muscle ache.   Skin: Negative.    Neurological:  Positive for headaches. Negative for dizziness.      Objective:     Physical Exam   Constitutional: She appears well-developed.  Non-toxic appearance. She does " not appear ill. No distress.   HENT:   Head: Normocephalic and atraumatic.   Ears:   Right Ear: Tympanic membrane, external ear and ear canal normal.   Left Ear: Tympanic membrane, external ear and ear canal normal.   Nose: Congestion present. No purulent discharge. Right sinus exhibits no maxillary sinus tenderness and no frontal sinus tenderness. Left sinus exhibits no maxillary sinus tenderness and no frontal sinus tenderness.   Mouth/Throat: Uvula is midline and mucous membranes are normal. Mucous membranes are moist. Posterior oropharyngeal erythema present. No oropharyngeal exudate.   Eyes: Conjunctivae and EOM are normal.   Neck: Neck supple.   Cardiovascular: Normal rate, regular rhythm and normal heart sounds.   Pulmonary/Chest: Effort normal and breath sounds normal.   Abdominal: Normal appearance.   Musculoskeletal: Normal range of motion.         General: Normal range of motion.   Lymphadenopathy:     She has no cervical adenopathy.   Neurological: no focal deficit. She is alert. She displays no weakness. Gait normal.   Skin: Skin is warm, dry, not diaphoretic, not pale and no rash.   Psychiatric: Her behavior is normal.     Results for orders placed or performed in visit on 08/03/25   SARS Coronavirus 2 Antigen, POCT Manual Read    Collection Time: 08/03/25  9:31 AM   Result Value Ref Range    SARS Coronavirus 2 Antigen Positive (A) Negative, Presumptive Negative     Acceptable Yes          Assessment:     1. COVID-19    2. Sore throat    3. Generalized body aches    4. Acute cough        Plan:       COVID-19  -     nirmatrelvir-ritonavir 300 mg (150 mg x 2)-100 mg copackaged tablets (EUA); Take 3 tablets by mouth 2 (two) times daily for 5 days. Each dose contains 2 nirmatrelvir (pink tablets) and 1 ritonavir (white tablet). Take all 3 tablets together  Dispense: 30 tablet; Refill: 0    Sore throat  -     SARS Coronavirus 2 Antigen, POCT Manual Read  -     Cancel: POCT Influenza A/B  MOLECULAR    Generalized body aches  -     SARS Coronavirus 2 Antigen, POCT Manual Read  -     Cancel: POCT Influenza A/B MOLECULAR    Acute cough  -     SARS Coronavirus 2 Antigen, POCT Manual Read          Medical Decision Making:   Clinical Tests:   Lab Tests: Ordered and Reviewed  Urgent Care Management:  - Rapid COVID-19 positive    - Covid Risk Score:  3  Pt considered moderate/high risk (score >1) and therefor qualifies for Paxlovid. Discussed with pt that this treatment is optional, but should be started within first 5 days of symptoms if taken. Also discussed common side effects of medication.  Pt voiced understanding.   - Advised patient to stay home and self quarantine until symptoms are getting better overall AND they are fever free for at least 24 hours without fever reducing medications.   - Tylenol and/or NSAIDs as needed for fever/pain control.   - Supportive care for sore throat (salt water gargles, lozenges, chloraseptic spray, cough drops, warm tea, etc.)  - OTC medications prn for cold symptoms. Home care for symptoms and OTC recommendations were discussed in clinic and included in AVS.  - Rest and drink plenty of fluids.  - F/u with PCP or rtc if symptoms worsen or fail to improve. Strict ED precautions given for any emergent symptoms.

## 2025-08-03 NOTE — LETTER
[id number] [facility] [address] ? [place], [address] ? Phone [phone] ? Fax             Return to Work/School    Patient: Lexis Allen  YOB: 1960   Date: 08/03/2025      To Whom It May Concern:     Lexis Allen was in contact with/seen in my office on 08/03/2025.COVID-19 is present in our communities across the state. Not all patients are eligible or appropriate to be tested. In this situation, she meets the following criteria:     Lexis Allen has met the criteria for COVID-19 testing and has a POSITIVE result. She can return to work/school on 8/7/25 or once they fever free for 24 hours without the use of fever reducing medications. Patient does NOT need to be re-tested to return to work/school.        If you have any questions or concerns, or if I can be of further assistance, please do not hesitate to contact me.     Sincerely,    Jolene Gonsales PA-C

## 2025-08-03 NOTE — PATIENT INSTRUCTIONS
General Instructions for Upper Respiratory Infection (URI):    What is a URI?   An upper respiratory infection (URI), also known as the common cold, is an acute infection of the head and chest. Generally, it affects the nose, throat, airways, sinuses and/or ears. URIs are typically caused by a virus and are among the most common diagnoses in Urgent Care.   While COVID and Flu are viruses most commonly tested for in Urgent Care, there are many other viruses that can cause similar symptoms such as: Respiratory Syncytial virus (RSV), Rhinovirus, Adenovirus, Enterovirus, Ricky-Barr virus (EBV), human meta pneumovirus, Parvovirus B19 (Fifth's disease).     How long will it last?   Probably longer than youd like. Symptoms usually worsen during the first 3-5 days, followed by gradual improvement. Most URIs resolve within 10-14 days, even without treatment. People with URIs are most contagious during the first 2-3 days of symptoms and rarely after 1 week. However, a person can remain contagious for several days after symptoms subside.     Treatment   Antibiotics only work on bacterial infections, and will not treat a virus. Because URIs usually are caused by viruses, antibiotics are not an effective treatment.  If taken when not needed, antibiotics can be harmful and can expose you to unnecessary side effects such as allergic reaction (rash, anaphylaxis), yeast infection, nausea, vomiting, diarrhea, Clostridioides difficile (C. diff), immune dysregulation, longer illness and antibiotic resistance. Fortunately, there are many options that help alleviate symptoms when used as directed. The treatments below can help you feel better while your body's own defenses are fighting the virus.    Fever and/or Pain:    Use a pain reliever, such as acetaminophen (Tylenol) or Ibuprofen (Advil). Avoid NSAIDs (Ibuprofen, Aleve, Motrin, Aspirin) if you are pregnant, have advanced kidney disease or history of stomach ulcers/bleeding.      "Dosages listed below are recommended for the average size adult       Acetaminophen (Tylenol): 500mg-650mg every 4 hours, not to exceed 4000mg in 24 hours       Ibuprofen (Advil, Motrin): 400mg-600mg every 6 hours (take with food or eat at least 30 minutes before taking medication)    Nasal congestion, post nasal dripping, or sinus pressure:    Use an oral decongestant, such as pseudoephedrine or Mucinex D to reduce nasal and sinus congestion. Decongestants are available at pharmacies. Decongestants should not be used by individuals with certain medical conditions such as hypertension (high blood pressure) or any history of heart palpitations. If you DO have Hypertension or palpitations, it is safe to take Coricidin HBP for relief of sinus symptoms.   Nasal sprays, such as fluticasone (Flonase), can help relief sneezing, runny nose and nasal congestion, and may take up to one week of consistent use to manage symptoms.     Nasal rinsing with saline nasal spray or salt water (e.g., neti pot) can help relieve nasal dryness.    Use a warm mist humidifier or take a steamy shower to increase moisture in the air and soothe oral and nasal tissues that become irritated with dry air.    Non-drowsy antihistamines during the day, such as Zyrtec, Claritin, Allegra may help with runny nose, post nasal drip, and sneezing. Benadryl can be used at night as needed, unless you are already taking a "night-time" cold medication.   Vicks vapor rub may be helpful to use at night.    Zantac will help if there is reflux from the post nasal drip and helpful to take at night.    Sore throat:    Use a pain reliever, such as acetaminophen (Tylenol) or Ibuprofen (Advil).    Gargle with salt water (1/2 tsp of salt dissolved in 8 oz of warm water). Salt water can be used as often as you like.    Throat lozenges or throat sprays (Cepacol lozenges or Chloroseptic spray) may bring some relief by increasing saliva production and lubricating your " "throat.    Drink plenty of fluids (e.g., water, diluted juice, tea) to soothe your throat and to stay well hydrated. Honey/lemon water or warm tea may be soothing.   A mixture of Maalox and Benadryl, often referred to as "magic mouthwash," is commonly used to relieve mouth pain and sores. The mixture is typically made by combining equal parts of liquid Benadryl (diphenhydramine) and Maalox (aluminum hydroxide/magnesium hydroxide). It's used as a mouthwash, swishing and spitting it out, or dabbed onto sores, depending on the patient's age and ability to swish.  Avoid eat/drinking for 30 minutes after use.    Coughing:    Coughing often occurs during the later stages of a URI. It may be dry or produce phlegm or mucus.    Medications that contain dextromethorphan (helps to suppress a cough) and/or Guaifenesin (thins mucus and relieves chest congestion). Examples that include both are Robitussin DM, Mucinex DM, Delsym. Guaifenesin works best when you drink plenty of water.     Tea with honey, when taken regularly, can soothe a sore throat and help suppress a cough.    Cough drops   Vicks vapor rub and/or humidifier at night    Take care with medications    Be familiar with the individual ingredients in every medication you take, so you treat your symptoms appropriately.    Watch for ingredient overlap between products (e.g., acetaminophen, ibuprofen, pseudoephedrine). Dont accidentally take too much of any ingredient.    Dont take medications longer than recommended.    Follow any specific instructions given by your health care provider. This is especially important if you have an underlying health condition.    If you have questions or concerns, ask a pharmacist for assistance or consult with your health care provider. Note: generic medications contain the same active ingredients as name brands.     Strengthen your immune system   Make sure you eat well (e.g., a healthy, balanced variety of foods).    Avoid alcohol " as it can directly suppress various immune responses.    Stay away from cigarettes, and second hand smoke.    Rest as much as possible and get plenty of sleep (at least 8 hours).     Cold-eeze (Zinc), Elderberry, Emergen-C, may help to reduce the duration of URI symptoms if taken early.    Reduce risk of spreading URI to others    URI infections are contagious; help reduce the spread.    Wash your hands frequently and/or use a hand , especially after touching your face or covering cough.    Cover your mouth when coughing or sneezing.    Avoid sharing items like cups and lip balm.     When to seek help    Sometimes upper respiratory infections become worse instead of better. Secondary bacterial infections or other complications can develop that require further treatment. Dont delay seeking medical evaluation if you experience any of the following symptoms:    A fever greater than 101°F for longer than 3 days.    Breathing problems like feeling short of breath, having pain or tightness in your chest, or wheezing (high pitched whistling sounds when you breath in)    A cough that is painful, worsening, or lasting longer than two weeks.    A bad sore throat that lasts longer than three days, or worsens.    Very swollen glands in your neck that arent going away    Pain in your face or teeth that is not improving after a few days of decongestant use    A headache that lasts longer than a few days or is very severe    A new rash    Persistent abdominal pain    Inability to tolerate oral fluids without vomiting   Severe weakness, dizziness, or passing out   Significant drowsiness or confusion     Please follow up with your primary care provider if your signs and symptoms have not resolved after 7-10 days or sooner if symptoms worsen.   If your condition worsens or fails to improve we recommend that you receive another evaluation at the emergency  room immediately or contact your primary medical clinic to discuss  your concerns.   You must understand that you have received Urgent Care treatment only and that you may be released before all of  your medical problems are known or treated. You, the patient, are responsible to arrange for follow up care as instructed.    More information    Medicine Plus: nlm.nih.gov/medlineplus/ commoncold.html    Centers for Disease Control &Prevention: cdc.gov/getsmart/community/ materials-references/print-materials/ hcp/adult-tract-infection.pdf           You have tested positive for COVID-19 today.      Per CDC recommendations, if you test positive for COVID-19 you may return to normal activities when, for at least 24 hours, both are true:     Your symptoms are getting better overall, and:  You have not had a fever AND are not using fever reducing medication     The CDC also recommends added precautions in the 5 days after return to normal activity including frequent hand washing, mask wearing, physical distancing.      CDC also recommends that if you develop a fever or starts to feel worse after you have returned to normal activities, you should return home and away from others for at least another 24 hours. The link below is a direct link to the CDC website with all this information.     https://www.cdc.gov/respiratory-viruses/prevention/precautions-when-sick.html    This is the most important part, both the CDC and the [facility] emphasize that you do not test out of isolation.  In fact, we do not retest if you were positive in the last 90 days.      During quarantine:   Separate yourself from other people in your home.  Call ahead before visiting your doctor.  Wear a facemask if you have to leave your home or around others.  Cover your coughs and sneezes.  Wash your hands often with soap and water; hand  can be used, too.  Avoid sharing personal household items.  Wipe down surfaces used daily.  Monitor your symptoms. Seek prompt medical attention if your illness is worsening (e.g.,  difficulty breathing).   Before seeking care, call your healthcare provider.  If you have a medical emergency and need to call 911, notify the dispatch personnel that you have, or are being evaluated for COVID-19. If possible, put on a facemask before emergency medical services arrive.       Close contacts should also follow these recommendations:  Make sure that you understand and can help the patient follow their provider's instructions for medication(s) and care. You should help the patient with basic needs in the home and provide support for getting groceries, prescriptions, and other personal needs.  Monitor the patient's symptoms. If the patient is getting sicker, call his or her healthcare provider and tell them that the patient has laboratory-confirmed COVID-19. If the patient has a medical emergency and you need to call 911, notify the dispatch personnel that the patient has, or is being evaluated for COVID-19.  Household members should stay in another room or be  from the patient. Household members should use a separate bedroom and bathroom, if available.  Prohibit visitors.  Household members should care for any pets in the home.  Make sure that shared spaces in the home have good air flow, such as by an air conditioner or an opened window, weather permitting.  Perform hand hygiene frequently. Wash your hands often with soap and water for at least 20 seconds or use an alcohol-based hand  (that contains > 60% alcohol) covering all surfaces of your hands and rubbing them together until they feel dry. Soap and water should be used preferentially.  Avoid touching your eyes, nose, and mouth.  The patient should wear a facemask. If the patient is not able to wear a facemask (for example, because it causes trouble breathing), caregivers should wear a mask when they are in the same room as the patient.  Wear a disposable facemask and gloves when you touch or have contact with the patient's  blood, stool, or body fluids, such as saliva, sputum, nasal mucus, vomit, urine.  Throw out disposable facemasks and gloves after using them. Do not reuse.  When removing personal protective equipment, first remove and dispose of gloves. Then, immediately clean your hands with soap and water or alcohol-based hand . Next, remove and dispose of facemask, and immediately clean your hands again with soap and water or alcohol-based hand .  You should not share dishes, drinking glasses, cups, eating utensils, towels, bedding, or other items with the patient. After the patient uses these items, you should wash them thoroughly (see below Wash laundry thoroughly).  Clean all high-touch surfaces, such as counters, tabletops, doorknobs, bathroom fixtures, toilets, phones, keyboards, tablets, and bedside tables, every day. Also, clean any surfaces that may have blood, stool, or body fluids on them.  Use a household cleaning spray or wipe, according to the label instructions. Labels contain instructions for safe and effective use of the cleaning product including precautions you should take when applying the product, such as wearing gloves and making sure you have good ventilation during use of the product.  Wash laundry thoroughly.  Immediately remove and wash clothes or bedding that have blood, stool, or body fluids on them.  Wear disposable gloves while handling soiled items and keep soiled items away from your body. Clean your hands (with soap and water or an alcohol-based hand ) immediately after removing your gloves.  Read and follow directions on labels of laundry or clothing items and detergent. In general, using a normal laundry detergent according to washing machine instructions and dry thoroughly using the warmest temperatures recommended on the clothing label.  Place all used disposable gloves, facemasks, and other contaminated items in a lined container before disposing of them with  other household waste. Clean your hands (with soap and water or an alcohol-based hand ) immediately after handling these items. Soap and water should be used preferentially if hands are visibly dirty.  Discuss any additional questions with your state or local health department or healthcare provider. Check available hours when contacting your local health department.     You must understand that you've received an Urgent Care treatment only and that you may be released before all your medical problems are known or treated. You, the patient, will arrange for follow up care as instructed. Follow up with your PCP or specialty clinic as directed within 2-5 days if not improved or as needed.  You can call [phone] to schedule an appointment with the appropriate provider.  If your condition worsens we recommend that you receive another evaluation at the emergency room immediately or contact your primary medical clinics after hours call service to discuss your concerns.  Please return here or go to the Emergency Department for any concerns or worsening of condition.

## 2025-08-08 DIAGNOSIS — E11.9 TYPE 2 DIABETES MELLITUS WITHOUT COMPLICATION, WITHOUT LONG-TERM CURRENT USE OF INSULIN: Primary | ICD-10-CM

## 2025-08-08 RX ORDER — INSULIN PUMP SYRINGE, 3 ML
EACH MISCELLANEOUS
Qty: 1 EACH | Refills: 0 | Status: SHIPPED | OUTPATIENT
Start: 2025-08-08 | End: 2026-08-08